# Patient Record
Sex: FEMALE | Race: WHITE | NOT HISPANIC OR LATINO | Employment: PART TIME | URBAN - METROPOLITAN AREA
[De-identification: names, ages, dates, MRNs, and addresses within clinical notes are randomized per-mention and may not be internally consistent; named-entity substitution may affect disease eponyms.]

---

## 2019-05-14 ENCOUNTER — HOSPITAL ENCOUNTER (OUTPATIENT)
Dept: RADIOLOGY | Facility: HOSPITAL | Age: 49
Discharge: HOME | End: 2019-05-14
Payer: COMMERCIAL

## 2019-05-14 ENCOUNTER — TRANSCRIBE ORDERS (OUTPATIENT)
Dept: ADMINISTRATIVE | Facility: HOSPITAL | Age: 49
End: 2019-05-14

## 2019-05-14 DIAGNOSIS — M25.551 RIGHT HIP PAIN: Primary | ICD-10-CM

## 2019-05-14 DIAGNOSIS — M25.551 RIGHT HIP PAIN: ICD-10-CM

## 2019-05-14 PROCEDURE — 72110 X-RAY EXAM L-2 SPINE 4/>VWS: CPT

## 2019-05-14 PROCEDURE — 73502 X-RAY EXAM HIP UNI 2-3 VIEWS: CPT

## 2021-03-30 DIAGNOSIS — Z23 ENCOUNTER FOR IMMUNIZATION: ICD-10-CM

## 2021-04-01 ENCOUNTER — IMMUNIZATIONS (OUTPATIENT)
Dept: FAMILY MEDICINE CLINIC | Facility: HOSPITAL | Age: 51
End: 2021-04-01

## 2021-04-01 DIAGNOSIS — Z23 ENCOUNTER FOR IMMUNIZATION: Primary | ICD-10-CM

## 2021-04-01 PROCEDURE — 91301 SARS-COV-2 / COVID-19 MRNA VACCINE (MODERNA) 100 MCG: CPT

## 2021-04-01 PROCEDURE — 0011A SARS-COV-2 / COVID-19 MRNA VACCINE (MODERNA) 100 MCG: CPT

## 2021-04-29 ENCOUNTER — IMMUNIZATIONS (OUTPATIENT)
Dept: FAMILY MEDICINE CLINIC | Facility: HOSPITAL | Age: 51
End: 2021-04-29

## 2021-04-29 DIAGNOSIS — Z23 ENCOUNTER FOR IMMUNIZATION: Primary | ICD-10-CM

## 2021-04-29 PROCEDURE — 0012A SARS-COV-2 / COVID-19 MRNA VACCINE (MODERNA) 100 MCG: CPT

## 2021-04-29 PROCEDURE — 91301 SARS-COV-2 / COVID-19 MRNA VACCINE (MODERNA) 100 MCG: CPT

## 2022-04-15 ENCOUNTER — APPOINTMENT (OUTPATIENT)
Dept: RADIOLOGY | Facility: CLINIC | Age: 52
End: 2022-04-15
Payer: COMMERCIAL

## 2022-04-15 DIAGNOSIS — M54.6 PAIN IN THORACIC SPINE: ICD-10-CM

## 2022-04-15 PROCEDURE — 72072 X-RAY EXAM THORAC SPINE 3VWS: CPT

## 2022-08-06 PROCEDURE — 99285 EMERGENCY DEPT VISIT HI MDM: CPT

## 2022-08-07 ENCOUNTER — APPOINTMENT (EMERGENCY)
Dept: ULTRASOUND IMAGING | Facility: HOSPITAL | Age: 52
DRG: 419 | End: 2022-08-07
Payer: COMMERCIAL

## 2022-08-07 ENCOUNTER — APPOINTMENT (EMERGENCY)
Dept: CT IMAGING | Facility: HOSPITAL | Age: 52
DRG: 419 | End: 2022-08-07
Payer: COMMERCIAL

## 2022-08-07 ENCOUNTER — HOSPITAL ENCOUNTER (INPATIENT)
Facility: HOSPITAL | Age: 52
LOS: 1 days | Discharge: HOME | DRG: 419 | End: 2022-08-07
Attending: EMERGENCY MEDICINE | Admitting: INTERNAL MEDICINE
Payer: COMMERCIAL

## 2022-08-07 ENCOUNTER — ANESTHESIA EVENT (INPATIENT)
Dept: PERIOP | Facility: HOSPITAL | Age: 52
DRG: 419 | End: 2022-08-07
Payer: COMMERCIAL

## 2022-08-07 ENCOUNTER — ANESTHESIA (INPATIENT)
Dept: PERIOP | Facility: HOSPITAL | Age: 52
DRG: 419 | End: 2022-08-07
Payer: COMMERCIAL

## 2022-08-07 VITALS
RESPIRATION RATE: 20 BRPM | HEART RATE: 84 BPM | DIASTOLIC BLOOD PRESSURE: 74 MMHG | OXYGEN SATURATION: 96 % | HEIGHT: 67 IN | BODY MASS INDEX: 27.66 KG/M2 | SYSTOLIC BLOOD PRESSURE: 138 MMHG | TEMPERATURE: 97.2 F

## 2022-08-07 DIAGNOSIS — K81.0 ACUTE CHOLECYSTITIS: ICD-10-CM

## 2022-08-07 DIAGNOSIS — K80.12 CALCULUS OF GALLBLADDER WITH ACUTE AND CHRONIC CHOLECYSTITIS WITHOUT OBSTRUCTION: ICD-10-CM

## 2022-08-07 DIAGNOSIS — K81.9 CHOLECYSTITIS: Primary | ICD-10-CM

## 2022-08-07 PROBLEM — F41.9 ANXIETY: Status: ACTIVE | Noted: 2022-08-07

## 2022-08-07 LAB
2HR DELTA HS TROPONIN: 0 NG/L
ALBUMIN SERPL BCP-MCNC: 4.6 G/DL (ref 3.5–5)
ALP SERPL-CCNC: 131 U/L (ref 34–104)
ALT SERPL W P-5'-P-CCNC: 101 U/L (ref 7–52)
ANION GAP SERPL CALCULATED.3IONS-SCNC: 11 MMOL/L (ref 4–13)
ANION GAP SERPL CALCULATED.3IONS-SCNC: 9 MMOL/L (ref 4–13)
AST SERPL W P-5'-P-CCNC: 207 U/L (ref 13–39)
ATRIAL RATE: 79 BPM
BASOPHILS # BLD AUTO: 0.01 THOUSANDS/ΜL (ref 0–0.1)
BASOPHILS NFR BLD AUTO: 0 % (ref 0–1)
BILIRUB SERPL-MCNC: 0.87 MG/DL (ref 0.2–1)
BILIRUB UR QL STRIP: NEGATIVE
BUN SERPL-MCNC: 10 MG/DL (ref 5–25)
BUN SERPL-MCNC: 11 MG/DL (ref 5–25)
CALCIUM SERPL-MCNC: 9.3 MG/DL (ref 8.4–10.2)
CALCIUM SERPL-MCNC: 9.9 MG/DL (ref 8.4–10.2)
CARDIAC TROPONIN I PNL SERPL HS: 3 NG/L
CARDIAC TROPONIN I PNL SERPL HS: 3 NG/L
CHLORIDE SERPL-SCNC: 103 MMOL/L (ref 96–108)
CHLORIDE SERPL-SCNC: 107 MMOL/L (ref 96–108)
CLARITY UR: CLEAR
CO2 SERPL-SCNC: 22 MMOL/L (ref 21–32)
CO2 SERPL-SCNC: 24 MMOL/L (ref 21–32)
COLOR UR: YELLOW
CREAT SERPL-MCNC: 0.77 MG/DL (ref 0.6–1.3)
CREAT SERPL-MCNC: 0.87 MG/DL (ref 0.6–1.3)
EOSINOPHIL # BLD AUTO: 0.03 THOUSAND/ΜL (ref 0–0.61)
EOSINOPHIL NFR BLD AUTO: 1 % (ref 0–6)
ERYTHROCYTE [DISTWIDTH] IN BLOOD BY AUTOMATED COUNT: 12.3 % (ref 11.6–15.1)
ERYTHROCYTE [DISTWIDTH] IN BLOOD BY AUTOMATED COUNT: 12.4 % (ref 11.6–15.1)
GFR SERPL CREATININE-BSD FRML MDRD: 76 ML/MIN/1.73SQ M
GFR SERPL CREATININE-BSD FRML MDRD: 88 ML/MIN/1.73SQ M
GLUCOSE SERPL-MCNC: 126 MG/DL (ref 65–140)
GLUCOSE SERPL-MCNC: 144 MG/DL (ref 65–140)
GLUCOSE UR STRIP-MCNC: NEGATIVE MG/DL
HCT VFR BLD AUTO: 36.2 % (ref 34.8–46.1)
HCT VFR BLD AUTO: 38.1 % (ref 34.8–46.1)
HGB BLD-MCNC: 12 G/DL (ref 11.5–15.4)
HGB BLD-MCNC: 12.6 G/DL (ref 11.5–15.4)
HGB UR QL STRIP.AUTO: NEGATIVE
IMM GRANULOCYTES # BLD AUTO: 0.01 THOUSAND/UL (ref 0–0.2)
IMM GRANULOCYTES NFR BLD AUTO: 0 % (ref 0–2)
KETONES UR STRIP-MCNC: NEGATIVE MG/DL
LEUKOCYTE ESTERASE UR QL STRIP: NEGATIVE
LIPASE SERPL-CCNC: 11 U/L (ref 11–82)
LYMPHOCYTES # BLD AUTO: 1.62 THOUSANDS/ΜL (ref 0.6–4.47)
LYMPHOCYTES NFR BLD AUTO: 29 % (ref 14–44)
MCH RBC QN AUTO: 30.7 PG (ref 26.8–34.3)
MCH RBC QN AUTO: 31.3 PG (ref 26.8–34.3)
MCHC RBC AUTO-ENTMCNC: 33.1 G/DL (ref 31.4–37.4)
MCHC RBC AUTO-ENTMCNC: 33.1 G/DL (ref 31.4–37.4)
MCV RBC AUTO: 93 FL (ref 82–98)
MCV RBC AUTO: 94 FL (ref 82–98)
MONOCYTES # BLD AUTO: 0.3 THOUSAND/ΜL (ref 0.17–1.22)
MONOCYTES NFR BLD AUTO: 5 % (ref 4–12)
NEUTROPHILS # BLD AUTO: 3.69 THOUSANDS/ΜL (ref 1.85–7.62)
NEUTS SEG NFR BLD AUTO: 65 % (ref 43–75)
NITRITE UR QL STRIP: NEGATIVE
NRBC BLD AUTO-RTO: 0 /100 WBCS
P AXIS: 73 DEGREES
PH UR STRIP.AUTO: 7 [PH]
PLATELET # BLD AUTO: 199 THOUSANDS/UL (ref 149–390)
PLATELET # BLD AUTO: 222 THOUSANDS/UL (ref 149–390)
PMV BLD AUTO: 8.2 FL (ref 8.9–12.7)
PMV BLD AUTO: 8.2 FL (ref 8.9–12.7)
POTASSIUM SERPL-SCNC: 3.9 MMOL/L (ref 3.5–5.3)
POTASSIUM SERPL-SCNC: 4 MMOL/L (ref 3.5–5.3)
PR INTERVAL: 152 MS
PROT SERPL-MCNC: 7.2 G/DL (ref 6.4–8.4)
PROT UR STRIP-MCNC: NEGATIVE MG/DL
QRS AXIS: 67 DEGREES
QRSD INTERVAL: 94 MS
QT INTERVAL: 406 MS
QTC INTERVAL: 465 MS
RBC # BLD AUTO: 3.84 MILLION/UL (ref 3.81–5.12)
RBC # BLD AUTO: 4.11 MILLION/UL (ref 3.81–5.12)
SODIUM SERPL-SCNC: 138 MMOL/L (ref 135–147)
SODIUM SERPL-SCNC: 138 MMOL/L (ref 135–147)
SP GR UR STRIP.AUTO: 1.01 (ref 1–1.03)
T WAVE AXIS: 84 DEGREES
UROBILINOGEN UR QL STRIP.AUTO: 0.2 E.U./DL
VENTRICULAR RATE: 79 BPM
WBC # BLD AUTO: 5.1 THOUSAND/UL (ref 4.31–10.16)
WBC # BLD AUTO: 5.66 THOUSAND/UL (ref 4.31–10.16)

## 2022-08-07 PROCEDURE — 88304 TISSUE EXAM BY PATHOLOGIST: CPT | Performed by: PATHOLOGY

## 2022-08-07 PROCEDURE — 96375 TX/PRO/DX INJ NEW DRUG ADDON: CPT

## 2022-08-07 PROCEDURE — NC001 PR NO CHARGE: Performed by: SURGERY

## 2022-08-07 PROCEDURE — 47562 LAPAROSCOPIC CHOLECYSTECTOMY: CPT | Performed by: SURGERY

## 2022-08-07 PROCEDURE — 84484 ASSAY OF TROPONIN QUANT: CPT | Performed by: EMERGENCY MEDICINE

## 2022-08-07 PROCEDURE — 85027 COMPLETE CBC AUTOMATED: CPT | Performed by: PHYSICIAN ASSISTANT

## 2022-08-07 PROCEDURE — 93005 ELECTROCARDIOGRAM TRACING: CPT

## 2022-08-07 PROCEDURE — 85025 COMPLETE CBC W/AUTO DIFF WBC: CPT | Performed by: EMERGENCY MEDICINE

## 2022-08-07 PROCEDURE — 80048 BASIC METABOLIC PNL TOTAL CA: CPT | Performed by: PHYSICIAN ASSISTANT

## 2022-08-07 PROCEDURE — 83690 ASSAY OF LIPASE: CPT | Performed by: EMERGENCY MEDICINE

## 2022-08-07 PROCEDURE — 96374 THER/PROPH/DIAG INJ IV PUSH: CPT

## 2022-08-07 PROCEDURE — 99223 1ST HOSP IP/OBS HIGH 75: CPT | Performed by: PHYSICIAN ASSISTANT

## 2022-08-07 PROCEDURE — 96361 HYDRATE IV INFUSION ADD-ON: CPT

## 2022-08-07 PROCEDURE — 76705 ECHO EXAM OF ABDOMEN: CPT

## 2022-08-07 PROCEDURE — 99254 IP/OBS CNSLTJ NEW/EST MOD 60: CPT | Performed by: SURGERY

## 2022-08-07 PROCEDURE — 93010 ELECTROCARDIOGRAM REPORT: CPT | Performed by: INTERNAL MEDICINE

## 2022-08-07 PROCEDURE — 74177 CT ABD & PELVIS W/CONTRAST: CPT

## 2022-08-07 PROCEDURE — 80053 COMPREHEN METABOLIC PANEL: CPT | Performed by: EMERGENCY MEDICINE

## 2022-08-07 PROCEDURE — 99284 EMERGENCY DEPT VISIT MOD MDM: CPT | Performed by: EMERGENCY MEDICINE

## 2022-08-07 PROCEDURE — 36415 COLL VENOUS BLD VENIPUNCTURE: CPT | Performed by: EMERGENCY MEDICINE

## 2022-08-07 PROCEDURE — 47562 LAPAROSCOPIC CHOLECYSTECTOMY: CPT | Performed by: PHYSICIAN ASSISTANT

## 2022-08-07 PROCEDURE — 0FT44ZZ RESECTION OF GALLBLADDER, PERCUTANEOUS ENDOSCOPIC APPROACH: ICD-10-PCS | Performed by: SURGERY

## 2022-08-07 PROCEDURE — 81003 URINALYSIS AUTO W/O SCOPE: CPT | Performed by: EMERGENCY MEDICINE

## 2022-08-07 RX ORDER — HYDROMORPHONE HCL IN WATER/PF 6 MG/30 ML
0.2 PATIENT CONTROLLED ANALGESIA SYRINGE INTRAVENOUS ONCE
Status: COMPLETED | OUTPATIENT
Start: 2022-08-07 | End: 2022-08-07

## 2022-08-07 RX ORDER — EPHEDRINE SULFATE 50 MG/ML
INJECTION INTRAVENOUS AS NEEDED
Status: DISCONTINUED | OUTPATIENT
Start: 2022-08-07 | End: 2022-08-07

## 2022-08-07 RX ORDER — LEVOTHYROXINE SODIUM 25 UG/1
1 CAPSULE ORAL
COMMUNITY

## 2022-08-07 RX ORDER — DEXAMETHASONE SODIUM PHOSPHATE 10 MG/ML
INJECTION, SOLUTION INTRAMUSCULAR; INTRAVENOUS AS NEEDED
Status: DISCONTINUED | OUTPATIENT
Start: 2022-08-07 | End: 2022-08-07

## 2022-08-07 RX ORDER — ONDANSETRON 2 MG/ML
4 INJECTION INTRAMUSCULAR; INTRAVENOUS EVERY 6 HOURS PRN
Status: DISCONTINUED | OUTPATIENT
Start: 2022-08-07 | End: 2022-08-09 | Stop reason: HOSPADM

## 2022-08-07 RX ORDER — ACETAMINOPHEN 325 MG/1
650 TABLET ORAL EVERY 4 HOURS PRN
Status: DISCONTINUED | OUTPATIENT
Start: 2022-08-07 | End: 2022-08-09 | Stop reason: HOSPADM

## 2022-08-07 RX ORDER — LEVOTHYROXINE SODIUM 137 UG/1
1 CAPSULE ORAL
COMMUNITY

## 2022-08-07 RX ORDER — HEPARIN SODIUM 5000 [USP'U]/ML
5000 INJECTION, SOLUTION INTRAVENOUS; SUBCUTANEOUS EVERY 8 HOURS SCHEDULED
Status: DISCONTINUED | OUTPATIENT
Start: 2022-08-07 | End: 2022-08-09 | Stop reason: HOSPADM

## 2022-08-07 RX ORDER — ONDANSETRON 2 MG/ML
4 INJECTION INTRAMUSCULAR; INTRAVENOUS EVERY 6 HOURS PRN
Status: DISCONTINUED | OUTPATIENT
Start: 2022-08-07 | End: 2022-08-08 | Stop reason: SDUPTHER

## 2022-08-07 RX ORDER — OXYCODONE HYDROCHLORIDE AND ACETAMINOPHEN 5; 325 MG/1; MG/1
1 TABLET ORAL EVERY 4 HOURS PRN
Status: DISCONTINUED | OUTPATIENT
Start: 2022-08-07 | End: 2022-08-09 | Stop reason: HOSPADM

## 2022-08-07 RX ORDER — LEVOTHYROXINE SODIUM 0.03 MG/1
25 TABLET ORAL
Status: DISCONTINUED | OUTPATIENT
Start: 2022-08-07 | End: 2022-08-09 | Stop reason: HOSPADM

## 2022-08-07 RX ORDER — PROPOFOL 10 MG/ML
INJECTION, EMULSION INTRAVENOUS AS NEEDED
Status: DISCONTINUED | OUTPATIENT
Start: 2022-08-07 | End: 2022-08-07

## 2022-08-07 RX ORDER — ACETAMINOPHEN 325 MG/1
650 TABLET ORAL EVERY 6 HOURS PRN
Status: DISCONTINUED | OUTPATIENT
Start: 2022-08-07 | End: 2022-08-08 | Stop reason: SDUPTHER

## 2022-08-07 RX ORDER — ATORVASTATIN CALCIUM 40 MG/1
1 TABLET, FILM COATED ORAL
COMMUNITY
Start: 2016-03-30

## 2022-08-07 RX ORDER — DOCUSATE SODIUM 100 MG/1
100 CAPSULE, LIQUID FILLED ORAL 2 TIMES DAILY PRN
Qty: 30 CAPSULE | Refills: 0 | Status: SHIPPED | OUTPATIENT
Start: 2022-08-07 | End: 2022-09-06

## 2022-08-07 RX ORDER — FENTANYL CITRATE/PF 50 MCG/ML
25 SYRINGE (ML) INJECTION
Status: DISCONTINUED | OUTPATIENT
Start: 2022-08-07 | End: 2022-08-07 | Stop reason: HOSPADM

## 2022-08-07 RX ORDER — LIDOCAINE HYDROCHLORIDE 20 MG/ML
INJECTION, SOLUTION EPIDURAL; INFILTRATION; INTRACAUDAL; PERINEURAL AS NEEDED
Status: DISCONTINUED | OUTPATIENT
Start: 2022-08-07 | End: 2022-08-07

## 2022-08-07 RX ORDER — LORAZEPAM 2 MG/ML
0.5 INJECTION INTRAMUSCULAR EVERY 6 HOURS PRN
Status: DISCONTINUED | OUTPATIENT
Start: 2022-08-07 | End: 2022-08-09 | Stop reason: HOSPADM

## 2022-08-07 RX ORDER — NICOTINE 21 MG/24HR
1 PATCH, TRANSDERMAL 24 HOURS TRANSDERMAL DAILY
Status: DISCONTINUED | OUTPATIENT
Start: 2022-08-07 | End: 2022-08-07

## 2022-08-07 RX ORDER — METRONIDAZOLE 500 MG/100ML
500 INJECTION, SOLUTION INTRAVENOUS EVERY 8 HOURS
Status: DISCONTINUED | OUTPATIENT
Start: 2022-08-07 | End: 2022-08-09 | Stop reason: HOSPADM

## 2022-08-07 RX ORDER — ACETAMINOPHEN 325 MG/1
650 TABLET ORAL EVERY 4 HOURS PRN
Qty: 20 TABLET | Refills: 0 | Status: SHIPPED | OUTPATIENT
Start: 2022-08-07

## 2022-08-07 RX ORDER — KETOROLAC TROMETHAMINE 30 MG/ML
INJECTION, SOLUTION INTRAMUSCULAR; INTRAVENOUS AS NEEDED
Status: DISCONTINUED | OUTPATIENT
Start: 2022-08-07 | End: 2022-08-07

## 2022-08-07 RX ORDER — CLINDAMYCIN PHOSPHATE 150 MG/ML
INJECTION, SOLUTION INTRAVENOUS AS NEEDED
Status: DISCONTINUED | OUTPATIENT
Start: 2022-08-07 | End: 2022-08-07

## 2022-08-07 RX ORDER — DOCUSATE SODIUM 100 MG/1
100 CAPSULE, LIQUID FILLED ORAL 2 TIMES DAILY
Status: DISCONTINUED | OUTPATIENT
Start: 2022-08-07 | End: 2022-08-09 | Stop reason: HOSPADM

## 2022-08-07 RX ORDER — ONDANSETRON 2 MG/ML
4 INJECTION INTRAMUSCULAR; INTRAVENOUS ONCE
Status: COMPLETED | OUTPATIENT
Start: 2022-08-07 | End: 2022-08-07

## 2022-08-07 RX ORDER — SODIUM CHLORIDE, SODIUM LACTATE, POTASSIUM CHLORIDE, CALCIUM CHLORIDE 600; 310; 30; 20 MG/100ML; MG/100ML; MG/100ML; MG/100ML
100 INJECTION, SOLUTION INTRAVENOUS CONTINUOUS
Status: DISCONTINUED | OUTPATIENT
Start: 2022-08-07 | End: 2022-08-09 | Stop reason: HOSPADM

## 2022-08-07 RX ORDER — CIPROFLOXACIN 2 MG/ML
400 INJECTION, SOLUTION INTRAVENOUS EVERY 12 HOURS
Status: DISCONTINUED | OUTPATIENT
Start: 2022-08-07 | End: 2022-08-09 | Stop reason: HOSPADM

## 2022-08-07 RX ORDER — MIDAZOLAM HYDROCHLORIDE 2 MG/2ML
INJECTION, SOLUTION INTRAMUSCULAR; INTRAVENOUS AS NEEDED
Status: DISCONTINUED | OUTPATIENT
Start: 2022-08-07 | End: 2022-08-07

## 2022-08-07 RX ORDER — ROCURONIUM BROMIDE 10 MG/ML
INJECTION, SOLUTION INTRAVENOUS AS NEEDED
Status: DISCONTINUED | OUTPATIENT
Start: 2022-08-07 | End: 2022-08-07

## 2022-08-07 RX ORDER — HYDROMORPHONE HCL/PF 1 MG/ML
0.5 SYRINGE (ML) INJECTION EVERY 4 HOURS PRN
Status: DISCONTINUED | OUTPATIENT
Start: 2022-08-07 | End: 2022-08-07

## 2022-08-07 RX ORDER — SODIUM CHLORIDE, SODIUM LACTATE, POTASSIUM CHLORIDE, CALCIUM CHLORIDE 600; 310; 30; 20 MG/100ML; MG/100ML; MG/100ML; MG/100ML
INJECTION, SOLUTION INTRAVENOUS CONTINUOUS PRN
Status: DISCONTINUED | OUTPATIENT
Start: 2022-08-07 | End: 2022-08-07

## 2022-08-07 RX ORDER — OXYCODONE HYDROCHLORIDE AND ACETAMINOPHEN 5; 325 MG/1; MG/1
1 TABLET ORAL EVERY 4 HOURS PRN
Qty: 10 TABLET | Refills: 0 | Status: SHIPPED | OUTPATIENT
Start: 2022-08-07 | End: 2022-08-17

## 2022-08-07 RX ORDER — FENTANYL CITRATE 50 UG/ML
INJECTION, SOLUTION INTRAMUSCULAR; INTRAVENOUS AS NEEDED
Status: DISCONTINUED | OUTPATIENT
Start: 2022-08-07 | End: 2022-08-07

## 2022-08-07 RX ORDER — OXYCODONE HYDROCHLORIDE AND ACETAMINOPHEN 5; 325 MG/1; MG/1
2 TABLET ORAL EVERY 4 HOURS PRN
Status: DISCONTINUED | OUTPATIENT
Start: 2022-08-07 | End: 2022-08-09 | Stop reason: HOSPADM

## 2022-08-07 RX ORDER — ONDANSETRON 2 MG/ML
4 INJECTION INTRAMUSCULAR; INTRAVENOUS ONCE AS NEEDED
Status: DISCONTINUED | OUTPATIENT
Start: 2022-08-07 | End: 2022-08-07 | Stop reason: HOSPADM

## 2022-08-07 RX ORDER — MAGNESIUM HYDROXIDE 1200 MG/15ML
LIQUID ORAL AS NEEDED
Status: DISCONTINUED | OUTPATIENT
Start: 2022-08-07 | End: 2022-08-07 | Stop reason: HOSPADM

## 2022-08-07 RX ORDER — HYDROMORPHONE HCL/PF 1 MG/ML
0.5 SYRINGE (ML) INJECTION EVERY 4 HOURS PRN
Status: DISCONTINUED | OUTPATIENT
Start: 2022-08-07 | End: 2022-08-09 | Stop reason: HOSPADM

## 2022-08-07 RX ORDER — CIPROFLOXACIN 2 MG/ML
400 INJECTION, SOLUTION INTRAVENOUS ONCE
Status: COMPLETED | OUTPATIENT
Start: 2022-08-07 | End: 2022-08-07

## 2022-08-07 RX ADMIN — LIDOCAINE HYDROCHLORIDE 100 MG: 20 INJECTION, SOLUTION EPIDURAL; INFILTRATION; INTRACAUDAL; PERINEURAL at 10:30

## 2022-08-07 RX ADMIN — LEVOTHYROXINE SODIUM 137 MCG: 25 TABLET ORAL at 05:24

## 2022-08-07 RX ADMIN — IOHEXOL 75 ML: 350 INJECTION, SOLUTION INTRAVENOUS at 01:08

## 2022-08-07 RX ADMIN — CLINDAMYCIN PHOSPHATE 600 MG: 150 INJECTION, SOLUTION INTRAVENOUS at 10:39

## 2022-08-07 RX ADMIN — LEVOTHYROXINE SODIUM 25 MCG: 25 TABLET ORAL at 05:26

## 2022-08-07 RX ADMIN — EPHEDRINE SULFATE 10 MG: 50 INJECTION INTRAVENOUS at 10:38

## 2022-08-07 RX ADMIN — OXYCODONE HYDROCHLORIDE AND ACETAMINOPHEN 1 TABLET: 5; 325 TABLET ORAL at 12:30

## 2022-08-07 RX ADMIN — SUGAMMADEX 200 MG: 100 INJECTION, SOLUTION INTRAVENOUS at 11:07

## 2022-08-07 RX ADMIN — FENTANYL CITRATE 100 MCG: 50 INJECTION INTRAMUSCULAR; INTRAVENOUS at 10:26

## 2022-08-07 RX ADMIN — HYDROMORPHONE HYDROCHLORIDE 0.5 MG: 1 INJECTION, SOLUTION INTRAMUSCULAR; INTRAVENOUS; SUBCUTANEOUS at 08:45

## 2022-08-07 RX ADMIN — HYDROMORPHONE HYDROCHLORIDE 0.2 MG: 0.2 INJECTION, SOLUTION INTRAMUSCULAR; INTRAVENOUS; SUBCUTANEOUS at 00:26

## 2022-08-07 RX ADMIN — DEXAMETHASONE SODIUM PHOSPHATE 10 MG: 10 INJECTION INTRAMUSCULAR; INTRAVENOUS at 10:50

## 2022-08-07 RX ADMIN — KETOROLAC TROMETHAMINE 30 MG: 30 INJECTION, SOLUTION INTRAMUSCULAR at 11:04

## 2022-08-07 RX ADMIN — ONDANSETRON 4 MG: 2 INJECTION INTRAMUSCULAR; INTRAVENOUS at 00:26

## 2022-08-07 RX ADMIN — HYDROMORPHONE HYDROCHLORIDE 0.2 MG: 0.2 INJECTION, SOLUTION INTRAMUSCULAR; INTRAVENOUS; SUBCUTANEOUS at 03:33

## 2022-08-07 RX ADMIN — PROPOFOL 180 MG: 10 INJECTION, EMULSION INTRAVENOUS at 10:30

## 2022-08-07 RX ADMIN — OXYCODONE HYDROCHLORIDE AND ACETAMINOPHEN 1 TABLET: 5; 325 TABLET ORAL at 12:58

## 2022-08-07 RX ADMIN — MIDAZOLAM 2 MG: 1 INJECTION INTRAMUSCULAR; INTRAVENOUS at 10:26

## 2022-08-07 RX ADMIN — CIPROFLOXACIN 400 MG: 2 INJECTION, SOLUTION INTRAVENOUS at 04:25

## 2022-08-07 RX ADMIN — SODIUM CHLORIDE 1000 ML: 0.9 INJECTION, SOLUTION INTRAVENOUS at 00:26

## 2022-08-07 RX ADMIN — METRONIDAZOLE 500 MG: 500 INJECTION, SOLUTION INTRAVENOUS at 03:34

## 2022-08-07 RX ADMIN — ONDANSETRON 4 MG: 2 INJECTION INTRAMUSCULAR; INTRAVENOUS at 11:05

## 2022-08-07 RX ADMIN — SODIUM CHLORIDE, SODIUM LACTATE, POTASSIUM CHLORIDE, AND CALCIUM CHLORIDE: .6; .31; .03; .02 INJECTION, SOLUTION INTRAVENOUS at 10:21

## 2022-08-07 RX ADMIN — ROCURONIUM BROMIDE 50 MG: 50 INJECTION INTRAVENOUS at 10:30

## 2022-08-07 NOTE — ANESTHESIA POSTPROCEDURE EVALUATION
Post-Op Assessment Note    CV Status:  Stable  Pain Score: 0    Pain management: adequate     Mental Status:  Sleepy   Hydration Status:  Euvolemic   PONV Controlled:  Controlled   Airway Patency:  Patent      Post Op Vitals Reviewed: Yes      Staff: CRNA         No complications documented      BP   142/67   Temp   97 2   Pulse  95   Resp   12   SpO2   98

## 2022-08-07 NOTE — CONSULTS
Consultation - [address] 47 y o  female MRN: [id number]  Unit/Bed#: ED 24 Encounter: [id number]    Physician Requesting Consult: Renata Miller DO    Reason for Consult / Principal Problem: Acute cholecystitis    Inpatient consult to Acute Care Surgery  Consult performed by: Bandar Martinez PA-C  Consult ordered by: Lynn Toledo PA-C        History of Present Illness: Sherren Rasp is a 47y o  year old female with PMH of Graves Dz, tobacco abuse (19 pk/yr hx), uterine prolapse, and anxiety who presented to the ED on 8/7 with complaints of abdominal pain x 7 hours (started at 5 pm night prior)  Patient reports eating dinner around 4 pm which included chicken, tator tots, cheese, and ranch dressing  Patient states the pain started in epigastric region with eventual diffuse pain across abdomen described as "knots" with a tightening sensation associated with heart burn, burping, N/V x 6, abdominal distention, and frequent BMs x 4-5, denies diarrhea but states loose, denies melena/BRBPR  Patient states burping helped but abdominal pain would shortly return  Patient denies prior episodes or recent illness  In the ED, found to be afebrile, VSS, no leukocytosis, elevated LFTs  She was given pain medication and started on cipro and flagy  RUQ US was obtained revealing cholelithiasis with pericholecystic fluid suggesting acute cholecystitis  Patient then admitted with recommendation for general surgery consult  Upon interview, patient relays story above  Currently rating pain a 3/10 without nausea  Last ate at dinner  She denies fever, chills, dizziness, lightheadedness, CP, palpations, or SOB  She denies hx of prior abdominal surgeries  FH pertinent for mother with hx of acute cholecystitis  Denies alcohol or drug use         Past Medical History:  Past Medical History:   Diagnosis Date    Graves disease        Past Surgical History:   Past Surgical History:   Procedure Laterality Date    DENTAL SURGERY      ENDOMETRIAL ABLATION      HAND SURGERY Right     TONSILLECTOMY         Family History:  Family History   Problem Relation Age of Onset    Hypertension Mother    [facility] Arthritis Mother     Diabetes Mother     Cholecystitis Mother     Hepatitis Mother         C    Arthritis Father     Cancer Father         prostate       Social History:  Social History     Substance and Sexual Activity   Alcohol Use Never     Social History     Substance and Sexual Activity   Drug Use Never     Social History     Tobacco Use   Smoking Status Current Every Day Smoker    Packs/day: 0 50   Smokeless Tobacco Never Used     Marital Status: /Civil Union      Home Medications:   Prior to Admission medications    Medication Sig Start Date End Date Taking?  Authorizing Provider   atorvastatin (LIPITOR) 40 mg tablet Take 1 tablet by mouth daily with dinner 3/30/16  Yes Historical Provider, MD   Levothyroxine Sodium 137 MCG CAPS Take 1 capsule by mouth daily in the early morning With the 25 to equal 142mcg   Yes Historical Provider, MD   Levothyroxine Sodium 25 MCG CAPS Take 1 tablet by mouth daily in the early morning With 137 to equal 142   Yes Historical Provider, MD   sertraline (ZOLOFT) 50 mg tablet Take 50 mg by mouth daily   Yes Historical Provider, MD       Inpatient Medications:  Scheduled Meds:   Current Facility-Administered Medications   Medication Dose Route Frequency Provider Last Rate    acetaminophen  650 mg Oral Q4H PRN Nicole Dorado PA-C      ciprofloxacin  400 mg Intravenous [address], [place]      HYDROmorphone  0 5 mg Intravenous Q4H PRN Nicole Dorado PA-C      levothyroxine  137 mcg Oral Early Morning [place], [place]      levothyroxine  25 mcg Oral Early Morning [place], [place]      LORazepam  0 5 mg Intravenous Q6H PRN Nicole Dorado PA-C      metroNIDAZOLE  500 mg Intravenous Murray Chatterjee MD Stopped (08/07/22 0423)    ondansetron  4 mg Intravenous Q6H PRN Pancho Block PA-C      sertraline  50 mg Oral HS Sheilalamar Cano PA-C       Continuous Infusions:    PRN Meds:  acetaminophen, 650 mg, Q4H PRN  HYDROmorphone, 0 5 mg, Q4H PRN  LORazepam, 0 5 mg, Q6H PRN  ondansetron, 4 mg, Q6H PRN        Allergies: Allergies   Allergen Reactions    Hydrocodone-Acetaminophen Vomiting    Penicillins Other (See Comments)    Pollen Extract Cough and Sneezing    Tamiflu [Oseltamivir] Other (See Comments)     Pt reports she gets very sick       Review of Systems:  Review of Systems   Constitutional: Positive for appetite change  Negative for chills, diaphoresis and fever  HENT: Negative for ear pain and sore throat  Eyes: Negative for pain and visual disturbance  Respiratory: Negative for cough and shortness of breath  Cardiovascular: Negative for chest pain and palpitations  Gastrointestinal: Positive for abdominal distention, abdominal pain, diarrhea, nausea and vomiting  Negative for blood in stool  Genitourinary: Negative for dysuria and hematuria  Musculoskeletal: Negative for arthralgias and back pain  Skin: Negative for color change and rash  Neurological: Negative for dizziness, seizures and syncope  All other systems reviewed and are negative  Vital Signs:     Vitals:    08/07/22 0002 08/07/22 0423   BP: 137/79 167/83   BP Location: Right arm    Pulse: 91 81   Resp: 20 18   Temp: 97 9 °F (36 6 °C)    TempSrc: Oral    SpO2: 97% 98%   Height: 5' 6 5" (1 689 m)      Invasive Devices  Report    Peripheral Intravenous Line  Duration           Peripheral IV 08/07/22 Right Antecubital <1 day                Physical Exam:  Physical Exam  Constitutional:       General: She is not in acute distress  Appearance: Normal appearance  She is not diaphoretic  HENT:      Head: Normocephalic and atraumatic        Right Ear: External ear normal       Left Ear: External ear normal       Nose: Nose normal    Eyes:      General:         Right eye: No discharge  Left eye: No discharge  Neck:      Vascular: No carotid bruit  Cardiovascular:      Rate and Rhythm: Normal rate and regular rhythm  Heart sounds: No murmur heard  No friction rub  Pulmonary:      Effort: Pulmonary effort is normal       Breath sounds: Normal breath sounds  No wheezing, rhonchi or rales  Abdominal:      General: Abdomen is flat  There is distension  Palpations: Abdomen is soft  Tenderness: There is abdominal tenderness  There is no guarding or rebound  Comments: RUQ tenderness, +[place] sign   Musculoskeletal:         General: No deformity or signs of injury  Cervical back: No rigidity  No muscular tenderness  Right lower leg: No edema  Left lower leg: No edema  Skin:     General: Skin is warm and dry  Capillary Refill: Capillary refill takes less than 2 seconds  Findings: No rash  Neurological:      General: No focal deficit present  Mental Status: She is alert and oriented to person, place, and time  Psychiatric:         Mood and Affect: Mood normal          Behavior: Behavior normal          Thought Content: Thought content normal          Judgment: Judgment normal          Lab Results:     Results from last 7 days   Lab Units 08/07/22  0425 08/07/22  0025   WBC Thousand/uL 5 10 5 66   HEMOGLOBIN g/dL 12 0 12 6   HEMATOCRIT % 36 2 38 1   PLATELETS Thousands/uL 199 222     Results from last 7 days   Lab Units 08/07/22  0425 08/07/22  0025   POTASSIUM mmol/L 3 9 4 0   CHLORIDE mmol/L 107 103   CO2 mmol/L 22 24   BUN mg/dL 10 11   CREATININE mg/dL 0 77 0 87   CALCIUM mg/dL 9 3 9 9         Lab Results   Component Value Date    HGBA1C 6 6 (H) 04/08/2022     No results found for: CKTOTAL, CKMB, CKMBINDEX, TROPONINI    Imaging Studies:     RUQ US:   Cholelithiasis, with pericholecystic fluid, and positive sonographic Newsome sign    Favio Hunt thickness is top normal in size  Suspicious for acute cholecystitis  I have personally reviewed pertinent films in PACS    Assessment:  Principal Problem:    Acute cholecystitis  Active Problems:    Graves disease    Smoking    Anxiety      Plan:  -NPO/IVF  -OR today for lap lashon  -PRN pain meds/antiemetics  -cipro/flagyl    Risks and benefits of lap lashon were discussed in detail today with the patient  They understand and wish to proceed with ultimately surgical intervention  Claudette Rae was comfortable with our recommendations, and their questions were answered to their satisfaction  SIGNATURE: Jerod Schuster PA-C  DATE: August 7, 2022  TIME: 7:20 AM    * This note was completed in part utilizing [employer] direct voice recognition software  Grammatical errors, random word insertion, spelling mistakes, and incomplete sentences may be an occasional consequence of the system secondary to software limitations, ambient noise and hardware issues  At the time of dictation, efforts were made to edit, clarify and /or correct errors  Please read the chart carefully and recognize, using context, where substitutions have occurred  If you have any questions or concerns about the context, text or information contained within the body of this dictation, please contact myself, the provider, for further clarification

## 2022-08-07 NOTE — ED PROVIDER NOTES
History  Chief Complaint   Patient presents with    Abdominal Pain     Pt reports mid abd pain and indigestion that started after dinner; n/v, bloating and cramping: denies diarrhea     This is a 48-year-old female complains of epigastric, right upper quadrant and umbilical pain  States it is severe  Started approximately 1 hour after eating  She has never had anything like this before  She has never had any abdominal surgeries before  Notes that she has a strong family history of biliary disease  Notes that she also has a history of uterine prolapse personally  States urination has been normal recently  Notes that since the onset of discomfort she has had repeated vomiting and episodes of loose but formed bowel movements  States she has feeling of some mild distension  Symptoms are worse with palpation  Nothing seems to make them better  Prior to Admission Medications   Prescriptions Last Dose Informant Patient Reported? Taking?    Levothyroxine Sodium 137 MCG CAPS   Yes Yes   Sig: Take 1 capsule by mouth daily in the early morning With the 25 to equal 142mcg   Levothyroxine Sodium 25 MCG CAPS   Yes Yes   Sig: Take 1 tablet by mouth daily in the early morning With 137 to equal 142   atorvastatin (LIPITOR) 40 mg tablet   Yes Yes   Sig: Take 1 tablet by mouth daily with dinner   sertraline (ZOLOFT) 50 mg tablet   Yes Yes   Sig: Take 50 mg by mouth daily      Facility-Administered Medications: None       Past Medical History:   Diagnosis Date    Graves disease        Past Surgical History:   Procedure Laterality Date    DENTAL SURGERY      ENDOMETRIAL ABLATION      HAND SURGERY Right     TONSILLECTOMY         Family History   Problem Relation Age of Onset    Hypertension Mother    [facility] Arthritis Mother     Diabetes Mother     Cholecystitis Mother     Hepatitis Mother         C    Arthritis Father     Cancer Father         prostate     I have reviewed and agree with the history as documented  E-Cigarette/Vaping     E-Cigarette/Vaping Substances     Social History     Tobacco Use    Smoking status: Current Every Day Smoker     Packs/day: 0 50    Smokeless tobacco: Never Used   Substance Use Topics    Alcohol use: Never    Drug use: Never       Review of Systems   Constitutional: Negative for activity change, fatigue and fever  HENT: Negative for congestion  Eyes: Negative for visual disturbance  Respiratory: Negative for cough, chest tightness and shortness of breath  Cardiovascular: Negative for chest pain  Gastrointestinal: Positive for abdominal pain, diarrhea, nausea and vomiting  Genitourinary: Negative for dysuria  Skin: Negative for rash  Neurological: Negative for dizziness, weakness and numbness  Physical Exam  Physical Exam  Constitutional:       Appearance: She is well-developed  She is not ill-appearing, toxic-appearing or diaphoretic  HENT:      Head: Normocephalic and atraumatic  Eyes:      Conjunctiva/sclera: Conjunctivae normal       Pupils: Pupils are equal, round, and reactive to light  Cardiovascular:      Rate and Rhythm: Normal rate and regular rhythm  Heart sounds: Normal heart sounds  Pulmonary:      Effort: Pulmonary effort is normal  No respiratory distress  Breath sounds: Normal breath sounds  Abdominal:      General: Bowel sounds are normal       Palpations: Abdomen is soft  Tenderness: There is generalized abdominal tenderness and tenderness in the right upper quadrant, epigastric area and periumbilical area  There is guarding  There is no rebound  Musculoskeletal:         General: Normal range of motion  Cervical back: Normal range of motion and neck supple  Skin:     General: Skin is warm and dry  Capillary Refill: Capillary refill takes less than 2 seconds  Neurological:      General: No focal deficit present  Mental Status: She is alert and oriented to person, place, and time  Psychiatric:         Mood and Affect: Mood normal          Behavior: Behavior normal          Vital Signs  ED Triage Vitals [08/07/22 0002]   Temperature Pulse Respirations Blood Pressure SpO2   97 9 °F (36 6 °C) 91 20 137/79 97 %      Temp Source Heart Rate Source Patient Position - Orthostatic VS BP Location FiO2 (%)   Oral Monitor Sitting Right arm --      Pain Score       7           Vitals:    08/07/22 0002 08/07/22 0423   BP: 137/79 167/83   Pulse: 91 81   Patient Position - Orthostatic VS: Sitting          Visual Acuity  Visual Acuity    Flowsheet Row Most Recent Value   L Pupil Size (mm) 3   R Pupil Size (mm) 3   L Pupil Shape Round   R Pupil Shape Round          ED Medications  Medications   metroNIDAZOLE (FLAGYL) IVPB (premix) 500 mg 100 mL (0 mg Intravenous Stopped 8/7/22 0423)   ciprofloxacin (CIPRO) IVPB (premix in 5% dextrose) 400 mg 200 mL (has no administration in time range)   acetaminophen (TYLENOL) tablet 650 mg (has no administration in time range)   ondansetron (ZOFRAN) injection 4 mg (has no administration in time range)   HYDROmorphone (DILAUDID) injection 0 5 mg (has no administration in time range)   levothyroxine tablet 137 mcg (137 mcg Oral Given 8/7/22 0524)   levothyroxine tablet 25 mcg (25 mcg Oral Given 8/7/22 0526)   sertraline (ZOLOFT) tablet 50 mg (has no administration in time range)   LORazepam (ATIVAN) injection 0 5 mg (has no administration in time range)   ondansetron (ZOFRAN) injection 4 mg (4 mg Intravenous Given 8/7/22 0026)   HYDROmorphone HCl (DILAUDID) injection 0 2 mg (0 2 mg Intravenous Given 8/7/22 0026)   sodium chloride 0 9 % bolus 1,000 mL (0 mL Intravenous Stopped 8/7/22 0147)   iohexol (OMNIPAQUE) 350 MG/ML injection (MULTI-DOSE) 75 mL (75 mL Intravenous Given 8/7/22 0108)   HYDROmorphone HCl (DILAUDID) injection 0 2 mg (0 2 mg Intravenous Given 8/7/22 0333)   ciprofloxacin (CIPRO) IVPB (premix in 5% dextrose) 400 mg 200 mL (400 mg Intravenous New Bag 8/7/22 0425)       Diagnostic Studies  Results Reviewed     Procedure Component Value Units Date/Time    Basic metabolic panel [[id number]] Collected: 08/07/22 0425    Lab Status: Final result Specimen: Blood from Arm, Right Updated: 08/07/22 0451     Sodium 138 mmol/L      Potassium 3 9 mmol/L      Chloride 107 mmol/L      CO2 22 mmol/L      ANION GAP 9 mmol/L      BUN 10 mg/dL      Creatinine 0 77 mg/dL      Glucose 126 mg/dL      Calcium 9 3 mg/dL      eGFR 88 ml/min/1 73sq m     Narrative:      [place] guidelines for Chronic Kidney Disease (CKD):     Stage 1 with normal or high GFR (GFR > 90 mL/min/1 73 square meters)    Stage 2 Mild CKD (GFR = 60-89 mL/min/1 73 square meters)    Stage 3A Moderate CKD (GFR = 45-59 mL/min/1 73 square meters)    Stage 3B Moderate CKD (GFR = 30-44 mL/min/1 73 square meters)    Stage 4 Severe CKD (GFR = 15-29 mL/min/1 73 square meters)    Stage 5 End Stage CKD (GFR <15 mL/min/1 73 square meters)  Note: GFR calculation is accurate only with a steady state creatinine    CBC (With Platelets) [[id number]]  (Abnormal) Collected: 08/07/22 0425    Lab Status: Final result Specimen: Blood from Arm, Right Updated: 08/07/22 0435     WBC 5 10 Thousand/uL      RBC 3 84 Million/uL      Hemoglobin 12 0 g/dL      Hematocrit 36 2 %      MCV 94 fL      MCH 31 3 pg      MCHC 33 1 g/dL      RDW 12 4 %      Platelets 496 Thousands/uL      MPV 8 2 fL     HS Troponin I 2hr [[id number]]  (Normal) Collected: 08/07/22 0334    Lab Status: Final result Specimen: Blood from Arm, Right Updated: 08/07/22 0407     hs TnI 2hr 3 ng/L      Delta 2hr hsTnI 0 ng/L     UA w Reflex to Microscopic w Reflex to Culture [[id number]]  (Normal) Collected: 08/07/22 0143    Lab Status: Final result Specimen: Urine, Clean Catch Updated: 08/07/22 0149     Color, UA Yellow     Clarity, UA Clear     Specific Gravity, UA 1 010     pH, UA 7 0     Leukocytes, UA Negative     Nitrite, UA Negative     Protein, UA Negative mg/dl      Glucose, UA Negative mg/dl      Ketones, UA Negative mg/dl      Urobilinogen, UA 0 2 E U /dl      Bilirubin, UA Negative     Occult Blood, UA Negative    HS Troponin 0hr (reflex protocol) [[id number]]  (Normal) Collected: 08/07/22 0025    Lab Status: Final result Specimen: Blood from Arm, Right Updated: 08/07/22 0056     hs TnI 0hr 3 ng/L     CMP [[id number]]  (Abnormal) Collected: 08/07/22 0025    Lab Status: Final result Specimen: Blood from Arm, Right Updated: 08/07/22 0048     Sodium 138 mmol/L      Potassium 4 0 mmol/L      Chloride 103 mmol/L      CO2 24 mmol/L      ANION GAP 11 mmol/L      BUN 11 mg/dL      Creatinine 0 87 mg/dL      Glucose 144 mg/dL      Calcium 9 9 mg/dL       U/L       U/L      Alkaline Phosphatase 131 U/L      Total Protein 7 2 g/dL      Albumin 4 6 g/dL      Total Bilirubin 0 87 mg/dL      eGFR 76 ml/min/1 73sq m     Narrative:      [place] guidelines for Chronic Kidney Disease (CKD):     Stage 1 with normal or high GFR (GFR > 90 mL/min/1 73 square meters)    Stage 2 Mild CKD (GFR = 60-89 mL/min/1 73 square meters)    Stage 3A Moderate CKD (GFR = 45-59 mL/min/1 73 square meters)    Stage 3B Moderate CKD (GFR = 30-44 mL/min/1 73 square meters)    Stage 4 Severe CKD (GFR = 15-29 mL/min/1 73 square meters)    Stage 5 End Stage CKD (GFR <15 mL/min/1 73 square meters)  Note: GFR calculation is accurate only with a steady state creatinine    Lipase [[id number]]  (Normal) Collected: 08/07/22 0025    Lab Status: Final result Specimen: Blood from Arm, Right Updated: 08/07/22 0048     Lipase 11 u/L     CBC and differential [[id number]]  (Abnormal) Collected: 08/07/22 0025    Lab Status: Final result Specimen: Blood from Arm, Right Updated: 08/07/22 0032     WBC 5 66 Thousand/uL      RBC 4 11 Million/uL      Hemoglobin 12 6 g/dL      Hematocrit 38 1 %      MCV 93 fL      MCH 30 7 pg      MCHC 33 1 g/dL      RDW 12 3 %      MPV 8 2 fL Platelets 595 Thousands/uL      nRBC 0 /100 WBCs      Neutrophils Relative 65 %      Immat GRANS % 0 %      Lymphocytes Relative 29 %      Monocytes Relative 5 %      Eosinophils Relative 1 %      Basophils Relative 0 %      Neutrophils Absolute 3 69 Thousands/µL      Immature Grans Absolute 0 01 Thousand/uL      Lymphocytes Absolute 1 62 Thousands/µL      Monocytes Absolute 0 30 Thousand/µL      Eosinophils Absolute 0 03 Thousand/µL      Basophils Absolute 0 01 Thousands/µL                  US right upper quadrant   Final Result by Shakila Lanza MD (08/07 2329)      Cholelithiasis, with pericholecystic fluid, and positive sonographic Newsome sign  Wall thickness is top normal in size  Suspicious for acute cholecystitis  Recommend general surgery consultation  The study was marked in [facility] for immediate notification  Workstation performed: [id number]         CT Abdomen pelvis with contrast   Final Result by Joanie Jacome MD (08/07 0123)      No acute intra-abdominal abnormality  No free air or free fluid  Normal appendix visualized  Cholelithiasis with no pericholecystic inflammatory changes  Workstation performed: [id number]                    Procedures  Procedures         ED Course                               SBIRT 20yo+    Flowsheet Row Most Recent Value   SBIRT (25 yo +)    In order to provide better care to our patients, we are screening all of our patients for alcohol and drug use  Would it be okay to ask you these screening questions? Yes Filed at: 08/07/2022 0009   Initial Alcohol Screen: US AUDIT-C     1  How often do you have a drink containing alcohol? 0 Filed at: 08/07/2022 0009   2  How many drinks containing alcohol do you have on a typical day you are drinking? 0 Filed at: 08/07/2022 0009   3a  Male UNDER 65: How often do you have five or more drinks on one occasion? 0 Filed at: 08/07/2022 0009   3b  FEMALE Any Age, or MALE 65+:  How often do you have 4 or more drinks on one occassion? 0 Filed at: 08/07/2022 0009   Audit-C Score 0 Filed at: 08/07/2022 0009   DINO: How many times in the past year have you    Used an illegal drug or used a prescription medication for non-medical reasons? Never Filed at: 08/07/2022 0009                    MDM  Number of Diagnoses or Management Options  Cholecystitis: new and requires workup  Diagnosis management comments: This is a 68-year-old female with cholecystitis  Patient discussed with Dr Mikael Santacruz of surgery who will consult  Patient admitted to medical service  Started on Cipro Flagyl due to penicillin allergy  Pain controlled with Dilaudid, nausea controlled with Zofran  Stable at the time of admission  States understanding and agreement with the plan  Amount and/or Complexity of Data Reviewed  Clinical lab tests: ordered and reviewed  Tests in the radiology section of CPT®: ordered and reviewed  Discuss the patient with other providers: yes        Disposition  Final diagnoses:   Cholecystitis     Time reflects when diagnosis was documented in both MDM as applicable and the Disposition within this note     Time User Action Codes Description Comment    8/7/2022  3:28 AM Dana Lal Add [K81 9] Cholecystitis     8/7/2022  3:47 AM Boo PRESTON Add [K81 0] Acute cholecystitis       ED Disposition     ED Disposition   Admit    Condition   Stable    Date/Time   Sun Aug 7, 2022  3:28 AM    Comment   Case was discussed with clement and the patient's admission status was agreed to be Admission Status: inpatient status to the service of Dr ambriz   Follow-up Information    None         Patient's Medications   Discharge Prescriptions    No medications on file       No discharge procedures on file      PDMP Review     None          ED Provider  Electronically Signed by           Carolina Leon MD  08/07/22 5831

## 2022-08-07 NOTE — ANESTHESIA PREPROCEDURE EVALUATION
Procedure:  CHOLECYSTECTOMY LAPAROSCOPIC (N/A Abdomen)    Relevant Problems   NEURO/PSYCH   (+) Anxiety      PULMONARY   (+) Smoking      Digestive   (+) Calculus of gallbladder with acute and chronic cholecystitis without obstruction      Endocrine   (+) Graves disease             Anesthesia Plan  ASA Score- 2     Anesthesia Type- general with ASA Monitors  Additional Monitors:   Airway Plan: ETT  Plan Factors-    Chart reviewed  Induction- intravenous  Postoperative Plan- Plan for postoperative opioid use  Planned trial extubation    Informed Consent- Anesthetic plan and risks discussed with patient  I personally reviewed this patient with the CRNA  Discussed and agreed on the Anesthesia Plan with the TOD Bonilla

## 2022-08-07 NOTE — ASSESSMENT & PLAN NOTE
· NPO  · Surgery consult  · Pain control  · IVF  · Monitor liver enzymes  · ED reviewed w/ surgery recommended cipro/flagyl  · RUQ US:  Cholelithiasis, with pericholecystic fluid, and positive sonographic Newsome sign   Wall thickness is top normal in size   Suspicious for acute cholecystitis   Recommend general surgery consultation

## 2022-08-07 NOTE — DISCHARGE INSTRUCTIONS
Laparoscopic Cholecystectomy   AMBULATORY CARE:   What you need to know about a laparoscopic cholecystectomy:  Laparoscopic cholecystectomy is surgery to remove gallstones and your gallbladder  How to prepare for surgery: Your surgeon will tell you how to prepare  You may be told not to eat or drink anything after midnight on the day of surgery  Arrange to have someone drive you home after surgery and stay with you for 24 hours  Tell your surgeon all the medicines you currently take  He or she will tell you if you need to stop any medicine for surgery, and when to stop  He or she will tell you which medicines to take or not take on the day of surgery  You may need blood or urine tests  You may also need x-rays, an ultrasound, or a CT scan  Tell your surgeon if you had an allergic reaction to contrast liquid  Tell your surgeon about any allergies you have, including medicines and anesthesia  What will happen during surgery: Your surgeon will make between 1 and 4 small incisions in your abdomen or belly button  He or she will insert small tools into the incisions  Your abdomen will be filled with carbon dioxide gas to make it swell  This helps your surgeon see your organs better and gives more room to move the tools around  Your surgeon will look for and remove gallstones in and around your gallbladder  X-rays or an ultrasound may be used  Your surgeon will remove your gallbladder through one of the incisions  The carbon dioxide will be released from your abdomen  The incisions will be closed with stitches, medical glue, or adhesive strips, then covered with bandages  What to expect after surgery: You will be taken to a recovery room until you are fully awake  Healthcare providers will monitor you closely for any problems  Providers will help you walk around to prevent blood clots  You may be able to go home later the same day, or you may stay in the hospital overnight    Pain, a sore throat, nausea, and vomiting are common after this surgery  These should get better within a few days  You may also have diarrhea that lasts up to a few months  Medicines may be given to prevent or treat pain, nausea, and vomiting  Medicines may also be given to prevent a bacterial infection  Blood thinners may be given to prevent blood clots  You may be bleed or bruise more easily while you are taking blood thinners  Your surgeon will tell you when to remove the bandages covering the surgery area  He or she will tell you when it is okay to start bathing  You may need to take showers instead of baths for a few days  Your surgeon will tell you when you can drive, return to work, and do your regular daily activities  You will be shown how to care for the surgery area and check for signs of infection  You will also be told which foods to eat in the days and weeks after surgery  Risks of a laparoscopic cholecystectomy:   You could bleed more than expected or get an infection  Any carbon dioxide gas still in your body can cause neck and shoulder pain  Your gallbladder may leak bile into your abdomen during or after surgery  This can cause a severe infection or an abscess  You may still have gallstones after surgery  You may need a different procedure to remove them  Your surgeon may need to make a larger incision than expected during surgery  Your bile duct, bowel, or other organs could be damaged during surgery  This can be life-threatening  Call your local emergency number (911 in the [address] [facility]) if:   You feel lightheaded, short of breath, and have chest pain  You cough up blood  Seek care immediately if:   Your arm or leg feels warm, tender, and painful  It may look swollen and red  You cannot stop vomiting  Your bowel movements are black or bloody  You have pain in your abdomen and it is swollen or hard  You have a fever over 101°F (38°C) or chills      Call your doctor or surgeon if: You have pain or nausea that is not relieved by medicine  You have redness and swelling around your incision sites  You have blood or pus leaking from your incision sites  You are constipated, have diarrhea, or your bowel movements are pale  Your skin or eyes are yellow  You have questions or concerns about your surgery, condition, or care  Medicines: You may need any of the following:  Prescription pain medicine  may be given  Ask your healthcare provider how to take this medicine safely  Some prescription pain medicines contain acetaminophen  Do not take other medicines that contain acetaminophen without talking to your healthcare provider  Too much acetaminophen may cause liver damage  Prescription pain medicine may cause constipation  Ask your healthcare provider how to prevent or treat constipation  NSAIDs  help decrease swelling and pain or fever  This medicine is available with or without a doctor's order  NSAIDs can cause stomach bleeding or kidney problems in certain people  If you take blood thinner medicine, always ask your healthcare provider if NSAIDs are safe for you  Always read the medicine label and follow directions  Take your medicine as directed  Contact your healthcare provider if you think your medicine is not helping or if you have side effects  Tell him or her if you are allergic to any medicine  Keep a list of the medicines, vitamins, and herbs you take  Include the amounts, and when and why you take them  Bring the list or the pill bottles to follow-up visits  Carry your medicine list with you in case of an emergency  Take deep breaths and cough 10 times each hour: This will decrease your risk for a lung infection  Take a deep breath and hold it for as long as you can  Then let the air out and cough strongly  You may be given an incentive spirometer to help you take deep breaths  Put the plastic piece in your mouth and take a slow, deep breath   Then let the air out and cough  Repeat these steps 10 times every hour  Care for the surgery area:   Remove the bandages as directed  Your surgeon may tell you to remove the bandages the day after surgery  Keep the area clean and dry  You may take a shower the day after your surgery  Do not take baths, swim, or soak in a hot tub until your surgeon says it is okay  Check for signs of infection each day  Check the area for swelling, red streaks, or pus  Tell your surgeon right away if you see any of these  Hug a pillow against the surgery area before you sneeze or cough  This will help prevent pain and protect the surgery area  What to eat after surgery:   Eat low-fat foods for 4 to 6 weeks  while your body learns to digest fat without a gallbladder  Slowly increase the amount of fat that you eat  Drink more liquids  Ask how much liquid to drink and which liquids are best for you  When to return to work and other activities:   Rest often  and slowly increase your activity level each day  If an activity causes pain, wait several days before you do that activity again  Do not drive  for the first 24 hours after surgery  Your surgeon will tell you when it is okay to drive after the first 24 hours  This is usually after you have stopped taking narcotic pain medicine for a few days  Do not lift anything heavier than 10 pounds  for 4 to 6 weeks, or as directed  You may return to work or other activities  as soon as your pain is controlled and you feel comfortable  This is usually 5 to 7 days after surgery  Follow up with your doctor or surgeon as directed:  Write down your questions so you remember to ask them during your visits  © Copyright [employer] 2022 Information is for End User's use only and may not be sold, redistributed or otherwise used for commercial purposes   All illustrations and images included in CareNotes® are the copyrighted property of A D A M , Inc  or [employer]  The above information is an  only  It is not intended as medical advice for individual conditions or treatments  Talk to your doctor, nurse or pharmacist before following any medical regimen to see if it is safe and effective for you

## 2022-08-07 NOTE — UTILIZATION REVIEW
Inpatient Admission Authorization Request   NOTIFICATION OF INPATIENT ADMISSION/INPATIENT AUTHORIZATION REQUEST   SERVICING FACILITY:   [address] [place] [address]  Tax ID: [id number]  NPI: [id number]  Place of Service: Inpatient [facility]  [address]  Place of Service Code: 24     ATTENDING PROVIDER:  Attending Name and NPI#: Scottie Denver, Md [[id number]]  Address: [address] [place] [address]  Phone: [phone]     UTILIZATION REVIEW CONTACT:  Markel Morin, Utilization   Network Utilization Review Department  Phone: [phone]  Fax [phone]  Email: [email or web address]     PHYSICIAN ADVISORY SERVICES:  FOR ATHP-FN-KBNB REVIEW - MEDICAL NECESSITY DENIAL  Phone: [phone]  Fax: [phone]  Email: [email or web address]     TYPE OF REQUEST:  Inpatient Status     ADMISSION INFORMATION:  ADMISSION DATE/TIME: 8/7/22  3:32 AM  PATIENT DIAGNOSIS CODE/DESCRIPTION:  Acute cholecystitis [K81 0]  Cholecystitis [K81 9]  Calculus of gallbladder with acute and chronic cholecystitis without obstruction [K80 12]  DISCHARGE DATE/TIME: No discharge date for patient encounter  IMPORTANT INFORMATION:  Please contact Markel Morin directly with any questions or concerns regarding this request  Department voicemails are confidential     Send requests for admission clinical reviews, concurrent reviews, approvals, and administrative denials due to lack of clinical to fax [phone]

## 2022-08-07 NOTE — H&P
[address] Boo[address]ose 1970, 47 y o  female MRN: [id number]  Unit/Bed#: ED 24 Encounter: [id number]  Primary Care Provider: Dallis Brittle   Date and time admitted to hospital: 8/7/2022 12:09 AM    * Acute cholecystitis  Assessment & Plan  · NPO  · Surgery consult  · Pain control  · IVF  · Monitor liver enzymes  · ED reviewed w/ surgery recommended cipro/flagyl  · RUQ US:  Cholelithiasis, with pericholecystic fluid, and positive sonographic Newsome sign   Wall thickness is top normal in size   Suspicious for acute cholecystitis   Recommend general surgery consultation  Anxiety  Assessment & Plan  · Continue sertraline  · Prn lorazepam    Smoking  Assessment & Plan  · Declined patch  · Smoking cessation recommended    Graves disease  Assessment & Plan  · S/p radiation  · Continue levothryoxine    VTE Pharmacologic Prophylaxis: VTE Score: 3 Moderate Risk (Score 3-4) - Pharmacological DVT Prophylaxis Contraindicated  Sequential Compression Devices Ordered  Code Status:  Full code  Discussion with patient and  Armaurizio Barefoot    Anticipated Length of Stay: Patient will be admitted on an inpatient basis with an anticipated length of stay of greater than 2 midnights secondary to IV abx, surgery consult  Chief Complaint: abdominal pain    History of Present Illness: Darek Sapp is a 47 y o  female with a PMH of tobacco abuse, thyroid disease who presents with abdominal pain  Had dinner around 4 pm with tator tots, chicken, ranch dressing and cheese  Developed pain around 5-530pm  Pain was epigastric and to the right  Felt like stomach tying self in knots and getting tighter and tighter, noted heart burn, burping  After burping pain would resolve and then come back  Vomited 6 times, no blood  ED treatment: Dilaudid 0 2mg x2, zofran 4mg, 1L IVF, Cirpo 400mg, Flagyl 500mg  Review of Systems:  Review of Systems   Constitutional: Negative for chills and fever     Respiratory: Negative for cough and shortness of breath  Cardiovascular: Negative for chest pain  Gastrointestinal: Positive for abdominal pain, nausea and vomiting  Negative for diarrhea  Neurological: Positive for headaches  Negative for dizziness and weakness  All other systems reviewed and are negative  Past Medical and Surgical History:   Past Medical History:   Diagnosis Date    Graves disease        Past Surgical History:   Procedure Laterality Date    DENTAL SURGERY      ENDOMETRIAL ABLATION      HAND SURGERY Right     TONSILLECTOMY         Meds/Allergies:  Prior to Admission medications    Not on File     I have reviewed home medications with patient personally  Allergies:    Allergies   Allergen Reactions    Hydrocodone-Acetaminophen Vomiting    Penicillins Other (See Comments)    Pollen Extract Cough and Sneezing    Tamiflu [Oseltamivir] Other (See Comments)     Pt reports she gets very sick       Social History:  Marital Status: /Civil Union   Occupation:    Patient Pre-hospital Living Situation: Home  Patient Pre-hospital Level of Mobility: walks  Patient Pre-hospital Diet Restrictions:  None  Substance Use History:   Social History     Substance and Sexual Activity   Alcohol Use Never     Social History     Tobacco Use   Smoking Status Current Every Day Smoker    Packs/day: 0 50   Smokeless Tobacco Never Used     Social History     Substance and Sexual Activity   Drug Use Never       Family History:  Family History   Problem Relation Age of Onset    Hypertension Mother    [facility] Arthritis Mother     Diabetes Mother     Cholecystitis Mother     Hepatitis Mother         C    Arthritis Father     Cancer Father         prostate       Physical Exam:     Vitals:   Blood Pressure: 137/79 (08/07/22 0002)  Pulse: 91 (08/07/22 0002)  Temperature: 97 9 °F (36 6 °C) (08/07/22 0002)  Temp Source: Oral (08/07/22 0002)  Respirations: 20 (08/07/22 0002)  Height: 5' 6 5" (168 9 cm) (08/07/22 0002)  SpO2: 97 % (08/07/22 0002)    Physical Exam  Vitals reviewed  Constitutional:       Appearance: Normal appearance  She is ill-appearing  HENT:      Head: Normocephalic and atraumatic  Nose: Nose normal    Eyes:      General:         Right eye: No discharge  Left eye: No discharge  Extraocular Movements: Extraocular movements intact  Conjunctiva/sclera: Conjunctivae normal    Cardiovascular:      Rate and Rhythm: Normal rate and regular rhythm  Pulmonary:      Effort: Pulmonary effort is normal  No respiratory distress  Breath sounds: Normal breath sounds  No wheezing  Abdominal:      General: Bowel sounds are normal  There is no distension  Palpations: Abdomen is soft  Tenderness: There is abdominal tenderness  There is no guarding  Positive signs include Newsome's sign  Musculoskeletal:         General: No swelling or tenderness  Normal range of motion  Cervical back: Normal range of motion  Right lower leg: No edema  Left lower leg: No edema  Skin:     General: Skin is warm and dry  Capillary Refill: Capillary refill takes less than 2 seconds  Coloration: Skin is pale  Neurological:      General: No focal deficit present  Mental Status: She is alert and oriented to person, place, and time  Mental status is at baseline  Psychiatric:         Mood and Affect: Mood normal          Behavior: Behavior normal          Thought Content:  Thought content normal          Judgment: Judgment normal         Additional Data:     Lab Results:  Results from last 7 days   Lab Units 08/07/22  0025   WBC Thousand/uL 5 66   HEMOGLOBIN g/dL 12 6   HEMATOCRIT % 38 1   PLATELETS Thousands/uL 222   NEUTROS PCT % 65   LYMPHS PCT % 29   MONOS PCT % 5   EOS PCT % 1     Results from last 7 days   Lab Units 08/07/22  0025   SODIUM mmol/L 138   POTASSIUM mmol/L 4 0   CHLORIDE mmol/L 103   CO2 mmol/L 24   BUN mg/dL 11   CREATININE mg/dL 0 87 ANION GAP mmol/L 11   CALCIUM mg/dL 9 9   ALBUMIN g/dL 4 6   TOTAL BILIRUBIN mg/dL 0 87   ALK PHOS U/L 131*   ALT U/L 101*   AST U/L 207*   GLUCOSE RANDOM mg/dL 144*     Imaging: Reviewed radiology reports from this admission including: abdominal/pelvic CT  US right upper quadrant   Final Result by Olvin Patel MD (08/07 1170)      Cholelithiasis, with pericholecystic fluid, and positive sonographic Newsome sign  Wall thickness is top normal in size  Suspicious for acute cholecystitis  Recommend general surgery consultation  The study was marked in [facility] for immediate notification  Workstation performed: [id number]         CT Abdomen pelvis with contrast   Final Result by Reyna Knox MD (08/07 0123)      No acute intra-abdominal abnormality  No free air or free fluid  Normal appendix visualized  Cholelithiasis with no pericholecystic inflammatory changes  Workstation performed: [id number]             ** Please Note: This note has been constructed using a voice recognition system   **

## 2022-08-07 NOTE — OP NOTE
OPERATIVE REPORT  PATIENT NAME: Matt Mayer    :  1970  MRN: [id number]  Pt Location: [place] OR ROOM 01    SURGERY DATE: 2022    Surgeon(s) and Role:     Latasha Pagan MD - Primary   Nato Negrete, [facility]  The PA was necessary to provide expert assistance; i e  in the form of providing optimal exposure with retraction, suturing, and assistance with dissection in order to perform the most efficient operation and in order to optimize patient safety in the abscence of a qualified surgical resident  Preop Diagnosis:  Calculus of gallbladder with acute and chronic cholecystitis without obstruction [K80 12]    Post-Op Diagnosis Codes:     * Calculus of gallbladder with acute and chronic cholecystitis without obstruction [K80 12]    Procedure(s) (LRB):  CHOLECYSTECTOMY LAPAROSCOPIC (N/A)    Specimen(s):  ID Type Source Tests Collected by Time Destination   1 :  Tissue Gallbladder TISSUE Cas Kennedy MD 2022 1052        Estimated Blood Loss:   Minimal    Drains:  * No LDAs found *    Anesthesia Type:   General    Operative Indications:  Calculus of gallbladder with acute and chronic cholecystitis without obstruction [K80 12]  The patient is a 79-year-old female presenting with signs and symptoms of acute calculous cholecystitis for which definitive treatment by laparoscopic cholecystectomy is now indicated  Operative Findings:  The patient was found to have acute on chronic calculous cholecystitis  Laparoscopic cholecystectomy was performed in the routine fashion  During the course the dissection the critical structures defined  The cystic duct dissected out circumferentially  The cystic artery dissected out circumferentially  The cholecystectomy completed laparoscopically  The patient tolerated the procedure well  Complications:   None    Procedure and Technique:  The patient was taken to the operating room where they were properly identified, monitored and anesthetized   They received antibiotics perioperatively  Venodyne's were placed prior to the induction of anesthesia for DVT prophylaxis  The abdomen prepped and draped under sterile conditions using aseptic technique  Timeout performed  Skin incised in the right upper quadrant    Peritoneal cavity entered bluntly with a 5 mm trocar  Pneumoperitoneum established to 15 mmHg  4 quadrants of the abdomen and inspected laparoscopically and no additional pathology was identified  3 additional working ports placed  These were an 11 mm port in the epigastrium and 2 additional 5 mm ports in the right upper quadrant  The patient placed in reverse Trendelenburg, left side down  Gallbladder grasped at its dome retracted cephalad and to the right  Infundibulum grasped and retracted laterally  Corder of Calot defined and skeletonized  Cystic duct dissected out circumferentially  Cystic artery dissected out circumferentially  Cystic artery clipped below and divided above with harmonic juana  A clip placed on the up side of the cystic duct  Cystic duct clipped twice on the down side and divided between clips  Gallbladder dissected off the liver with harmonic juana  The gallbladder was placed in an Endobag and delivered through the epigastric trocar site  Pneumoperitoneum reestablished to 15 mmHg  Scope advanced and the right upper quadrant inspected  Good hemostasis found  The fascial defect at the epigastrium closed with an 0 Vicryl  Ports removed  Skin closed with subcuticular 4-0 Monocryl suture  Wounds infiltrated with half percent Marcaine  The wounds dressed  The patient extubated and taken to recovery in stable condition       I was present for the entire procedure    Patient Disposition:  PACU       SIGNATURE: Paige Perez MD  DATE: August 7, 2022  TIME: 11:07 AM

## 2022-08-07 NOTE — ED NOTES
Patient transported to [address] [place] [address], [address] [place] [address]  08/07/22 8682

## 2022-08-08 ENCOUNTER — TELEPHONE (OUTPATIENT)
Dept: SURGERY | Facility: CLINIC | Age: 52
End: 2022-08-08

## 2022-08-08 NOTE — TELEPHONE ENCOUNTER
Patients  Minda Stoddard called, Caity Guajardo gallbladder surgery over the weekend  Today she has a runny nose and is asking if it's okay for her to take Aleve D? Per Dr Sabra Mead that will be fine  Spoke with Minda Stoddard to make him aware of this

## 2022-08-08 NOTE — UTILIZATION REVIEW
Initial Clinical Review    Admission: Date/Time/Statement:   Admission Orders (From admission, onward)     Ordered        08/07/22 0332  INPATIENT ADMISSION  Once                      Orders Placed This Encounter   Procedures    INPATIENT ADMISSION     Standing Status:   Standing     Number of Occurrences:   1     Order Specific Question:   Level of Care     Answer:   Med Surg [16]     Order Specific Question:   Estimated length of stay     Answer:   More than 2 Midnights     Order Specific Question:   Certification     Answer:   I certify that inpatient services are medically necessary for this patient for a duration of greater than two midnights  See H&P and MD Progress Notes for additional information about the patient's course of treatment  ED Arrival Information     Expected   -    Arrival   8/6/2022 23:52    Acuity   Urgent            Means of arrival   Walk-In    Escorted by   Spouse    Service   Surgery-General    Admission type   Urgent            Arrival complaint   severe stomach pain vomiting           Chief Complaint   Patient presents with    Abdominal Pain     Pt reports mid abd pain and indigestion that started after dinner; n/v, bloating and cramping: denies diarrhea       Initial Presentation: 47 y o  female with PMH of with PMH of tobacco abuse, grave's disease, anxiety presented to the ED form home with abdominal pain  Pt reports that she developed R sided epigastric pain around 5-5:30 pm after dinner  She felt like her stomach was tying self in knots and getting tighter and tighter, noted heart burn, burping  After burping pain would resolve and then come back  Vomited 6 times, no blood  In the ED, RUQ US:  Cholelithiasis, with pericholecystic fluid, and positive sonographic Newsome sign   Wall thickness is top normal in size   Suspicious for acute cholecystitis  On exam, aaox3  Pale, ill appearing, abdominal tenderness, + Newsome's sign   Given Dilaudid 0 2mg x2, zofran 4mg, 1L IVF, Cirpo 400mg, Flagyl 500mg  Plan: Inpatient admission for evaluation and treatment of acute cholecystitis: NPO  Surgery consult  Pain control  IVF  Monitor liver enzymes  Cipro/flagyl    08/07 Gen Surg Consult: Acute cholecystitis: Plan: NPO/IVF  OR today for lap lashon  PRN pain meds/antiemetics  cipro/flagyl    OP Note:   SURGERY DATE: 8/7/2022  Procedure(s) (LRB): CHOLECYSTECTOMY LAPAROSCOPIC (N/A)  Anesthesia Type: General  Operative Findings:  The patient was found to have acute on chronic calculous cholecystitis  Laparoscopic cholecystectomy was performed in the routine fashion      During the course the dissection the critical structures defined  The cystic duct dissected out circumferentially  The cystic artery dissected out circumferentially       The cholecystectomy completed laparoscopically  The patient tolerated the procedure well  08/07 Progress Notes: Pt was extubated and taken to the recovery room in stable condition s/p Lap lashon today  /67, T 97 2  Patent airway  Sleepy  Discharged to home w/ home health on 08/07 @ 1144       ED Triage Vitals [08/07/22 0002]   Temperature Pulse Respirations Blood Pressure SpO2   97 9 °F (36 6 °C) 91 20 137/79 97 %      Temp Source Heart Rate Source Patient Position - Orthostatic VS BP Location FiO2 (%)   Oral Monitor Sitting Right arm --      Pain Score       7          Wt Readings from Last 1 Encounters:   12/15/14 78 9 kg (174 lb)     Additional Vital Signs:   Date/Time Temp Pulse Resp BP MAP (mmHg) SpO2 Calculated FIO2 (%) - Nasal Cannula O2 Flow Rate (L/min) Nasal Cannula O2 Flow Rate (L/min) O2 Device Cardiac (WDL) Patient Position - Orthostatic VS   08/07/22 1335 -- 84 20 138/74 -- 96 % -- -- -- None (Room air) -- --   08/07/22 1219 97 2 °F (36 2 °C) Abnormal  82 20 136/72 -- 94 % -- -- -- None (Room air) -- --   08/07/22 1208 -- -- 18 138/72 99 98 % 28 -- 2 L/min Nasal cannula WDL --   08/07/22 1200 -- 85 16 -- -- 98 % 36 -- 4 L/min Nasal cannula WDL --   08/07/22 1153 -- 87 24 Abnormal  147/78 105 95 % -- 4 L/min -- Nasal cannula WDL --   08/07/22 1138 -- 91 18 116/58 83 98 % -- 6 L/min -- Simple mask WDL --   08/07/22 1123 97 2 °F (36 2 °C) Abnormal  92 -- 135/60 -- 98 % -- 6 L/min -- Simple mask WDL --   08/07/22 0423 -- 81 18 167/83 -- 98 % -- -- -- None (Room air) -- --       Pertinent Labs/Diagnostic Test Results:   US right upper quadrant   Final Result by Kassy Ramirez MD (08/07 6131)      Cholelithiasis, with pericholecystic fluid, and positive sonographic Newsome sign  Wall thickness is top normal in size  Suspicious for acute cholecystitis  Recommend general surgery consultation  The study was marked in [facility] for immediate notification  Workstation performed: [id number]         CT Abdomen pelvis with contrast   Final Result by Chris Crespo MD (08/07 0123)      No acute intra-abdominal abnormality  No free air or free fluid  Normal appendix visualized  Cholelithiasis with no pericholecystic inflammatory changes              Workstation performed: [id number]           08/07 EKG result: Normal sinus rhythm  T wave abnormality, consider anterior ischemia  Prolonged QT      Results from last 7 days   Lab Units 08/07/22  0425 08/07/22  0025   WBC Thousand/uL 5 10 5 66   HEMOGLOBIN g/dL 12 0 12 6   HEMATOCRIT % 36 2 38 1   PLATELETS Thousands/uL 199 222   NEUTROS ABS Thousands/µL  --  3 69         Results from last 7 days   Lab Units 08/07/22  0425 08/07/22  0025   SODIUM mmol/L 138 138   POTASSIUM mmol/L 3 9 4 0   CHLORIDE mmol/L 107 103   CO2 mmol/L 22 24   ANION GAP mmol/L 9 11   BUN mg/dL 10 11   CREATININE mg/dL 0 77 0 87   EGFR ml/min/1 73sq m 88 76   CALCIUM mg/dL 9 3 9 9     Results from last 7 days   Lab Units 08/07/22  0025   AST U/L 207*   ALT U/L 101*   ALK PHOS U/L 131*   TOTAL PROTEIN g/dL 7 2   ALBUMIN g/dL 4 6   TOTAL BILIRUBIN mg/dL 0 87         Results from last 7 days   Lab Units 08/07/22  0426 08/07/22  0025   GLUCOSE RANDOM mg/dL 126 144*       Results from last 7 days   Lab Units 08/07/22  0334 08/07/22  0025   HS TNI 0HR ng/L  --  3   HS TNI 2HR ng/L 3  --    HSTNI D2 ng/L 0  --        Results from last 7 days   Lab Units 08/07/22  0025   LIPASE u/L 11                 Results from last 7 days   Lab Units 08/07/22  0143   CLARITY UA  Clear   COLOR UA  Yellow   SPEC GRAV UA  1 010   PH UA  7 0   GLUCOSE UA mg/dl Negative   KETONES UA mg/dl Negative   BLOOD UA  Negative   PROTEIN UA mg/dl Negative   NITRITE UA  Negative   BILIRUBIN UA  Negative   UROBILINOGEN UA E U /dl 0 2   LEUKOCYTES UA  Negative     ED Treatment:   Medication Administration from 08/06/2022 2352 to 08/07/2022 0944       Date/Time Order Dose Route Action     08/07/2022 0026 ondansetron (ZOFRAN) injection 4 mg 4 mg Intravenous Given     08/07/2022 0026 HYDROmorphone HCl (DILAUDID) injection 0 2 mg 0 2 mg Intravenous Given     08/07/2022 0147 sodium chloride 0 9 % bolus 1,000 mL 0 mL Intravenous Stopped     08/07/2022 0026 sodium chloride 0 9 % bolus 1,000 mL 1,000 mL Intravenous New Bag     08/07/2022 0108 iohexol (OMNIPAQUE) 350 MG/ML injection (MULTI-DOSE) 75 mL 75 mL Intravenous Given     08/07/2022 0333 HYDROmorphone HCl (DILAUDID) injection 0 2 mg 0 2 mg Intravenous Given     08/07/2022 0739 ciprofloxacin (CIPRO) IVPB (premix in 5% dextrose) 400 mg 200 mL 0 mg Intravenous Stopped     08/07/2022 0425 ciprofloxacin (CIPRO) IVPB (premix in 5% dextrose) 400 mg 200 mL 400 mg Intravenous New Bag     08/07/2022 0423 metroNIDAZOLE (FLAGYL) IVPB (premix) 500 mg 100 mL 0 mg Intravenous Stopped     08/07/2022 0334 metroNIDAZOLE (FLAGYL) IVPB (premix) 500 mg 100 mL 500 mg Intravenous New Bag     08/07/2022 0845 HYDROmorphone (DILAUDID) injection 0 5 mg 0 5 mg Intravenous Given     08/07/2022 0524 levothyroxine tablet 137 mcg 137 mcg Oral Given     08/07/2022 0526 levothyroxine tablet 25 mcg 25 mcg Oral Given        Past Medical History: Diagnosis Date    Graves disease      Present on Admission:   Calculus of gallbladder with acute and chronic cholecystitis without obstruction   Smoking   Graves disease   Anxiety      Admitting Diagnosis: Acute cholecystitis [K81 0]  Cholecystitis [K81 9]  Calculus of gallbladder with acute and chronic cholecystitis without obstruction [K80 12]  Age/Sex: 47 y o  female  Admission Orders:  SCD  I/O    Scheduled Medications:  ciprofloxacin, 400 mg, Intravenous, Q12H  docusate sodium, 100 mg, Oral, BID  heparin (porcine), 5,000 Units, Subcutaneous, Q8H VANITA  levothyroxine, 137 mcg, Oral, Early Morning  levothyroxine, 25 mcg, Oral, Early Morning  metroNIDAZOLE, 500 mg, Intravenous, Q8H  sertraline, 50 mg, Oral, HS      Continuous IV Infusions:  lactated ringers, 100 mL/hr, Intravenous, Continuous      PRN Meds:  acetaminophen, 650 mg, Oral, Q4H PRN  HYDROmorphone, 0 5 mg, Intravenous, Q4H PRN  LORazepam, 0 5 mg, Intravenous, Q6H PRN  ondansetron, 4 mg, Intravenous, Q6H PRN 08 /07 x 1  oxyCODONE-acetaminophen, 1 tablet, Oral, Q4H PRN 08/07 x 1  oxyCODONE-acetaminophen, 2 tablet, Oral, Q4H PRN 08/07 x 1        IP CONSULT TO ACUTE CARE SURGERY    Network Utilization Review Department  ATTENTION: Please call with any questions or concerns to [phone] and carefully listen to the prompts so that you are directed to the right person  All voicemails are confidential   Shonda Bee all requests for admission clinical reviews, approved or denied determinations and any other requests to dedicated fax number below belonging to the campus where the patient is receiving treatment   List of dedicated fax numbers for the Facilities:  [address] DENIALS (Administrative/Medical Necessity) [phone]   [address] [place] [address] (Maternity/NICU/Pediatrics) 261 [facility],[address] [address] [place] [id number] [place] [address] [address] [place] [address] [place] [address] [place] [id number] [phone]

## 2022-08-08 NOTE — UTILIZATION REVIEW
Initial Clinical Review    Admission: Date/Time/Statement:   Admission Orders (From admission, onward)     Ordered        08/07/22 0332  INPATIENT ADMISSION  Once                      Orders Placed This Encounter   Procedures    INPATIENT ADMISSION     Standing Status:   Standing     Number of Occurrences:   1     Order Specific Question:   Level of Care     Answer:   Med Surg [16]     Order Specific Question:   Estimated length of stay     Answer:   More than 2 Midnights     Order Specific Question:   Certification     Answer:   I certify that inpatient services are medically necessary for this patient for a duration of greater than two midnights  See H&P and MD Progress Notes for additional information about the patient's course of treatment  ED Arrival Information     Expected   -    Arrival   8/6/2022 23:52    Acuity   Urgent            Means of arrival   Walk-In    Escorted by   Spouse    Service   Surgery-General    Admission type   Urgent            Arrival complaint   severe stomach pain vomiting           Chief Complaint   Patient presents with    Abdominal Pain     Pt reports mid abd pain and indigestion that started after dinner; n/v, bloating and cramping: denies diarrhea       Initial Presentation: 47 y o  female ***    Date: ***   Day 2: ***    ED Triage Vitals [08/07/22 0002]   Temperature Pulse Respirations Blood Pressure SpO2   97 9 °F (36 6 °C) 91 20 137/79 97 %      Temp Source Heart Rate Source Patient Position - Orthostatic VS BP Location FiO2 (%)   Oral Monitor Sitting Right arm --      Pain Score       7          Wt Readings from Last 1 Encounters:   12/15/14 78 9 kg (174 lb)     Additional Vital Signs: ***  Pertinent Labs/Diagnostic Test Results:   US right upper quadrant   Final Result by Nathalia Guardado MD (08/07 0242)      Cholelithiasis, with pericholecystic fluid, and positive sonographic Newsome sign  Wall thickness is top normal in size  Suspicious for acute cholecystitis  Recommend general surgery consultation  The study was marked in [facility] for immediate notification  Workstation performed: [id number]         CT Abdomen pelvis with contrast   Final Result by Shannon Hedrick MD (08/07 0123)      No acute intra-abdominal abnormality  No free air or free fluid  Normal appendix visualized  Cholelithiasis with no pericholecystic inflammatory changes              Workstation performed: [id number]               Results from last 7 days   Lab Units 08/07/22  0425 08/07/22  0025   WBC Thousand/uL 5 10 5 66   HEMOGLOBIN g/dL 12 0 12 6   HEMATOCRIT % 36 2 38 1   PLATELETS Thousands/uL 199 222   NEUTROS ABS Thousands/µL  --  3 69         Results from last 7 days   Lab Units 08/07/22  0425 08/07/22  0025   SODIUM mmol/L 138 138   POTASSIUM mmol/L 3 9 4 0   CHLORIDE mmol/L 107 103   CO2 mmol/L 22 24   ANION GAP mmol/L 9 11   BUN mg/dL 10 11   CREATININE mg/dL 0 77 0 87   EGFR ml/min/1 73sq m 88 76   CALCIUM mg/dL 9 3 9 9     Results from last 7 days   Lab Units 08/07/22  0025   AST U/L 207*   ALT U/L 101*   ALK PHOS U/L 131*   TOTAL PROTEIN g/dL 7 2   ALBUMIN g/dL 4 6   TOTAL BILIRUBIN mg/dL 0 87         Results from last 7 days   Lab Units 08/07/22  0425 08/07/22  0025   GLUCOSE RANDOM mg/dL 126 144*             No results found for: BETA-HYDROXYBUTYRATE                   Results from last 7 days   Lab Units 08/07/22  0334 08/07/22  0025   HS TNI 0HR ng/L  --  3   HS TNI 2HR ng/L 3  --    HSTNI D2 ng/L 0  --                                              Results from last 7 days   Lab Units 08/07/22  0025   LIPASE u/L 11                 Results from last 7 days   Lab Units 08/07/22  0143   CLARITY UA  Clear   COLOR UA  Yellow   SPEC GRAV UA  1 010   PH UA  7 0   GLUCOSE UA mg/dl Negative   KETONES UA mg/dl Negative   BLOOD UA  Negative   PROTEIN UA mg/dl Negative   NITRITE UA  Negative   BILIRUBIN UA  Negative   UROBILINOGEN UA E U /dl 0 2   LEUKOCYTES UA  Negative ED Treatment:   Medication Administration from 08/06/2022 2352 to 08/07/2022 0944       Date/Time Order Dose Route Action Action by Comments     08/07/2022 0026 ondansetron (ZOFRAN) injection 4 mg 4 mg Intravenous Given Edouard Albarado RN      08/07/2022 0026 HYDROmorphone HCl (DILAUDID) injection 0 2 mg 0 2 mg Intravenous Given Salo Ye RN      08/07/2022 0147 sodium chloride 0 9 % bolus 1,000 mL 0 mL Intravenous Stopped Salo Ye RN      08/07/2022 0026 sodium chloride 0 9 % bolus 1,000 mL 1,000 mL Intravenous [facility]      08/07/2022 0108 iohexol (OMNIPAQUE) 350 MG/ML injection (MULTI-DOSE) 75 mL 75 mL Intravenous Given [employer]      08/07/2022 0333 HYDROmorphone HCl (DILAUDID) injection 0 2 mg 0 2 mg Intravenous Given Salo Ye RN      08/07/2022 2774 ciprofloxacin (CIPRO) IVPB (premix in 5% dextrose) 400 mg 200 mL 0 mg Intravenous Stopped Maribel Jacques RN      08/07/2022 0425 ciprofloxacin (CIPRO) IVPB (premix in 5% dextrose) 400 mg 200 mL 400 mg Intravenous [facility]      08/07/2022 0423 metroNIDAZOLE (FLAGYL) IVPB (premix) 500 mg 100 mL 0 mg Intravenous Stopped Edouard Albarado RN      08/07/2022 8936 metroNIDAZOLE (FLAGYL) IVPB (premix) 500 mg 100 mL 500 mg Intravenous [facility]      08/07/2022 0845 HYDROmorphone (DILAUDID) injection 0 5 mg 0 5 mg Intravenous Given Brigid Cristobal RN      08/07/2022 0524 levothyroxine tablet 137 mcg 137 mcg Oral Given Salo Ye RN      08/07/2022 5829 levothyroxine tablet 25 mcg 25 mcg Oral Given Salo Ye RN         Past Medical History:   Diagnosis Date    Graves disease      Present on Admission:   Calculus of gallbladder with acute and chronic cholecystitis without obstruction   Smoking   Graves disease   Anxiety      Admitting Diagnosis: Acute cholecystitis [K81 0]  Cholecystitis [K81 9]  Calculus of gallbladder with acute and chronic cholecystitis without obstruction [K80 12]  Age/Sex: 47 y o  female  Admission Orders:  Scheduled Medications:  ciprofloxacin, 400 mg, Intravenous, Q12H  docusate sodium, 100 mg, Oral, BID  heparin (porcine), 5,000 Units, Subcutaneous, Q8H VANITA  levothyroxine, 137 mcg, Oral, Early Morning  levothyroxine, 25 mcg, Oral, Early Morning  metroNIDAZOLE, 500 mg, Intravenous, Q8H  sertraline, 50 mg, Oral, HS      Continuous IV Infusions:  lactated ringers, 100 mL/hr, Intravenous, Continuous      PRN Meds:  acetaminophen, 650 mg, Oral, Q4H PRN  HYDROmorphone, 0 5 mg, Intravenous, Q4H PRN  LORazepam, 0 5 mg, Intravenous, Q6H PRN  ondansetron, 4 mg, Intravenous, Q6H PRN  oxyCODONE-acetaminophen, 1 tablet, Oral, Q4H PRN  oxyCODONE-acetaminophen, 2 tablet, Oral, Q4H PRN        IP CONSULT TO ACUTE CARE SURGERY    Network Utilization Review Department  ATTENTION: Please call with any questions or concerns to [phone] and carefully listen to the prompts so that you are directed to the right person  All voicemails are confidential   Aneta Bettencourt all requests for admission clinical reviews, approved or denied determinations and any other requests to dedicated fax number below belonging to the campus where the patient is receiving treatment   List of dedicated fax numbers for the Facilities:  [address] [place] [address] DENIALS (Administrative/Medical Necessity) [phone]   [address] [place] [address] (Maternity/NICU/Pediatrics) 261 [facility],[address]  [address] [place] [address] [address] [place] [address] [place] [address] [facility] [phone]

## 2022-08-10 NOTE — DISCHARGE SUMMARY
Discharge Summary - Damian Fletcher 47 y o  female MRN: [id number]    Unit/Bed#: OR [place] Encounter: [id number]    Admission Date:   Admission Orders (From admission, onward)     Ordered        08/07/22 0332  INPATIENT ADMISSION  Once                        Admitting Diagnosis: Acute cholecystitis [K81 0]  Cholecystitis [K81 9]  Calculus of gallbladder with acute and chronic cholecystitis without obstruction [K80 12]    HPI:  Patient presents to the hospital signs and symptoms of acute calculous cholecystitis for which definitive treatment by laparoscopic cholecystectomy during this hospitalization was indicated  Procedures Performed: No orders of the defined types were placed in this encounter  Summary of Hospital Course:  Patient was admitted to the hospital   She underwent laparoscopic cholecystectomy and was discharged home same day  Significant Findings, Care, Treatment and Services Provided:  Laparoscopic cholecystectomy    Complications:  None    Discharge Diagnosis:  Acute calculous cholecystitis    Medical Problems             Resolved Problems  Date Reviewed: 8/7/2022   None                 Condition at Discharge: good         Discharge instructions/Information to patient and family:   See after visit summary for information provided to patient and family  Provisions for Follow-Up Care:  See after visit summary for information related to follow-up care and any pertinent home health orders  PCP: Anita Head    Disposition: Home    Planned Readmission: No      Discharge Statement   I spent 30 minutes discharging the patient  This time was spent on the day of discharge  I had direct contact with the patient on the day of discharge  Additional documentation is required if more than 30 minutes were spent on discharge  Discharge Medications:  See after visit summary for reconciled discharge medications provided to patient and family

## 2022-08-11 ENCOUNTER — TELEPHONE (OUTPATIENT)
Dept: SURGERY | Facility: CLINIC | Age: 52
End: 2022-08-11

## 2022-08-11 NOTE — TELEPHONE ENCOUNTER
Pt called about making an appt with dr Modesto Dial because he removed her gallbladder and she states that she would have to give our office a call back because she needs a later appt time

## 2022-08-17 ENCOUNTER — TELEPHONE (OUTPATIENT)
Dept: SURGERY | Facility: CLINIC | Age: 52
End: 2022-08-17

## 2022-08-25 RX ORDER — GUAIFENESIN 600 MG/1
TABLET, EXTENDED RELEASE ORAL
COMMUNITY

## 2022-08-25 RX ORDER — FEXOFENADINE HYDROCHLORIDE 60 MG/1
TABLET, FILM COATED ORAL
COMMUNITY

## 2022-08-25 RX ORDER — MONTELUKAST SODIUM 10 MG/1
TABLET ORAL
COMMUNITY

## 2022-08-26 ENCOUNTER — OFFICE VISIT (OUTPATIENT)
Dept: SURGERY | Facility: CLINIC | Age: 52
End: 2022-08-26

## 2022-08-26 VITALS — TEMPERATURE: 97.2 F

## 2022-08-26 DIAGNOSIS — K80.12 CALCULUS OF GALLBLADDER WITH ACUTE AND CHRONIC CHOLECYSTITIS WITHOUT OBSTRUCTION: Primary | ICD-10-CM

## 2022-08-26 PROCEDURE — 99024 POSTOP FOLLOW-UP VISIT: CPT | Performed by: SURGERY

## 2022-08-26 NOTE — LETTER
August 26, 2022     Den Vega MD  [address] [place] [address]    Patient: Shakeel Grigsby   YOB: 1970   Date of Visit: 8/26/2022       Dear Dr Antonia Aguilar:    Thank you for referring Qian Bravo to me for evaluation  Below are my notes for this consultation  If you have questions, please do not hesitate to call me  I look forward to following your patient along with you  Sincerely,        Kavno Montiel MD        CC: No Recipients  Kavon Montiel MD  8/26/2022  8:53 AM  Incomplete  Assessment/Plan:    Calculus of gallbladder with acute and chronic cholecystitis without obstruction  Patient returns for routine scheduled follow-up status post laparoscopic cholecystectomy for the definitive treatment of her acute calculous cholecystitis  Today the patient voiced no complaints referable to her abdomen  On physical exam she is well-appearing  She is in no acute distress  Pleasant competent reliable as a historian  Abdomen soft  The surgical wounds clean dry intact  Operative note and pathology report reviewed with the patient  All questions answered to satisfaction the patient regarding her resumption of normal diet and activity  She has been cleared to return to work on the 29th of August without restriction  She has been encouraged to follow up with the practice at any point future with questions or concerns  Subjective:      Patient ID: Shakeel Grigsby is a 47 y o  female  Patient came in today for a post op lap lashon 08/07/22  Her inc are healed but there are starting to itch and there some bruising, but theres no drainage  Patient has no complaints of any nausea/vomiting, diarrhea/constipation, or fevers or chills  Dash GUERRA MA        The following portions of the patient's history were reviewed and updated as appropriate: allergies, current medications, past family history, past medical history, past social history, past surgical history and problem list     Review of Systems   Constitutional: Negative for chills and fever  HENT: Negative for ear pain and sore throat  Eyes: Negative for pain and visual disturbance  Respiratory: Negative for cough and shortness of breath  Cardiovascular: Negative for chest pain and palpitations  Gastrointestinal: Negative for abdominal pain and vomiting  Genitourinary: Negative for dysuria and hematuria  Musculoskeletal: Negative for arthralgias and back pain  Skin: Negative for color change and rash  Neurological: Negative for seizures and syncope  All other systems reviewed and are negative  Objective:      Temp (!) 97 2 °F (36 2 °C) (Temporal)          Physical Exam  Constitutional:       Appearance: She is well-developed  HENT:      Head: Normocephalic and atraumatic  Eyes:      Conjunctiva/sclera: Conjunctivae normal       Pupils: Pupils are equal, round, and reactive to light  Cardiovascular:      Rate and Rhythm: Normal rate and regular rhythm  Pulmonary:      Effort: Pulmonary effort is normal       Breath sounds: Normal breath sounds  Abdominal:      General: Bowel sounds are normal       Palpations: Abdomen is soft  Musculoskeletal:         General: Normal range of motion  Cervical back: Normal range of motion and neck supple  Skin:     General: Skin is warm and dry  Neurological:      Mental Status: She is alert and oriented to person, place, and time  Psychiatric:         Behavior: Behavior normal          Thought Content:  Thought content normal          Judgment: Judgment normal

## 2022-08-26 NOTE — PROGRESS NOTES
Assessment/Plan:    Calculus of gallbladder with acute and chronic cholecystitis without obstruction  Patient returns for routine scheduled follow-up status post laparoscopic cholecystectomy for the definitive treatment of her acute calculous cholecystitis  Today the patient voiced no complaints referable to her abdomen  On physical exam she is well-appearing  She is in no acute distress  Pleasant competent reliable as a historian  Abdomen soft  The surgical wounds clean dry intact  Operative note and pathology report reviewed with the patient  All questions answered to satisfaction the patient regarding her resumption of normal diet and activity  She has been cleared to return to work on the 29th of August without restriction  She has been encouraged to follow up with the practice at any point future with questions or concerns  Subjective:      Patient ID: Levester Severin is a 47 y o  female  Patient came in today for a post op lap lashon 08/07/22  Her inc are healed but there are starting to itch and there some bruising, but theres no drainage  Patient has no complaints of any nausea/vomiting, diarrhea/constipation, or fevers or chills  Dash GUERRA MA        The following portions of the patient's history were reviewed and updated as appropriate: allergies, current medications, past family history, past medical history, past social history, past surgical history and problem list     Review of Systems   Constitutional: Negative for chills and fever  HENT: Negative for ear pain and sore throat  Eyes: Negative for pain and visual disturbance  Respiratory: Negative for cough and shortness of breath  Cardiovascular: Negative for chest pain and palpitations  Gastrointestinal: Negative for abdominal pain and vomiting  Genitourinary: Negative for dysuria and hematuria  Musculoskeletal: Negative for arthralgias and back pain  Skin: Negative for color change and rash  Neurological: Negative for seizures and syncope  All other systems reviewed and are negative  Objective:      Temp (!) 97 2 °F (36 2 °C) (Temporal)          Physical Exam  Constitutional:       Appearance: She is well-developed  HENT:      Head: Normocephalic and atraumatic  Eyes:      Conjunctiva/sclera: Conjunctivae normal       Pupils: Pupils are equal, round, and reactive to light  Cardiovascular:      Rate and Rhythm: Normal rate and regular rhythm  Pulmonary:      Effort: Pulmonary effort is normal       Breath sounds: Normal breath sounds  Abdominal:      General: Bowel sounds are normal       Palpations: Abdomen is soft  Musculoskeletal:         General: Normal range of motion  Cervical back: Normal range of motion and neck supple  Skin:     General: Skin is warm and dry  Neurological:      Mental Status: She is alert and oriented to person, place, and time  Psychiatric:         Behavior: Behavior normal          Thought Content:  Thought content normal          Judgment: Judgment normal

## 2022-08-26 NOTE — ASSESSMENT & PLAN NOTE
Patient returns for routine scheduled follow-up status post laparoscopic cholecystectomy for the definitive treatment of her acute calculous cholecystitis  Today the patient voiced no complaints referable to her abdomen  On physical exam she is well-appearing  She is in no acute distress  Pleasant competent reliable as a historian  Abdomen soft  The surgical wounds clean dry intact  Operative note and pathology report reviewed with the patient  All questions answered to satisfaction the patient regarding her resumption of normal diet and activity  She has been cleared to return to work on the 29th of August without restriction  She has been encouraged to follow up with the practice at any point future with questions or concerns

## 2024-04-06 NOTE — PROGRESS NOTES
Assessment        Diagnoses and all orders for this visit:    Vaginal atrophy  -     estradiol (Yuvafem) 10 MCG TABS vaginal tablet; Insert 1 tablet (10 mcg total) into the vagina 2 (two) times a week    Encntr for gyn exam (general) (routine) w/o abn findings    Cervical cancer screening  -     Liquid-based pap, screening    Encounter for screening mammogram for breast cancer  -     Cancel: Mammo screening bilateral w 3d & cad; Future  -     Mammo screening bilateral w 3d & cad; Future    Screening for colorectal cancer  -     Cancel: Ambulatory Referral to Gastroenterology; Future  -     Cologuard             Plan      All questions answered.  Await pap smear results.  Endocervical curettage.  Follow up in 1 year.  Follow up as needed.  Mammogram.  Pap smear.   Cologuard    Patient with vaginal atrophy.  She was prescribed Vagifem to use twice weekly for vaginal dryness.    Subjective      Adriana Yarbrough is a 53 y.o. female who presents for annual exam.      Chief Complaint   Patient presents with    New Patient Visit     Est care, yearly. Discuss dryness  during intercourse      6/3/14 / Alex h/o Novasure endometrial ablation for menorrhagia  No postmenopausal bleeding  She has vaginal dryness, has used lubricants        Last Pap: 14  Last mammogram: none  Colorectal cancer screening: none    Current contraception: post menopausal status  History of abnormal Pap smear: no  History of abnormal mammogram: no  Family history of uterine or ovarian cancer: no  Family history of breast cancer: no  Family history of colon cancer: no    Mom breast cysts - no CA  Dad prostate CA      OB History    Para Term  AB Living   0 0 0 0 0 0   SAB IAB Ectopic Multiple Live Births   0 0 0 0 0       Menstrual History:  OB History          0    Para   0    Term   0       0    AB   0    Living   0         SAB   0    IAB   0    Ectopic   0    Multiple   0    Live Births   0                Menarche  age: 11  No LMP recorded.             Past Medical History:   Diagnosis Date    Graves disease      Past Surgical History:   Procedure Laterality Date    CHOLECYSTECTOMY LAPAROSCOPIC N/A 8/7/2022    Procedure: CHOLECYSTECTOMY LAPAROSCOPIC;  Surgeon: Bryan Joel MD;  Location: [facility];  Service: General    DENTAL SURGERY      ENDOMETRIAL ABLATION      HAND SURGERY Right     TONSILLECTOMY       Family History   Problem Relation Age of Onset    Hypertension Mother     Arthritis Mother     Diabetes Mother     Cholecystitis Mother     Hepatitis Mother         C    Arthritis Father     Cancer Father         prostate       Social History     Tobacco Use    Smoking status: Every Day     Current packs/day: 0.50     Types: Cigarettes    Smokeless tobacco: Never   Vaping Use    Vaping status: Never Used   Substance Use Topics    Alcohol use: Never    Drug use: Never          Current Outpatient Medications:     atorvastatin (LIPITOR) 40 mg tablet, Take 1 tablet by mouth daily with dinner, Disp: , Rfl:     guaiFENesin (MUCINEX) 600 mg 12 hr tablet, Take by mouth, Disp: , Rfl:     Levothyroxine Sodium 137 MCG CAPS, Take 1 capsule by mouth daily in the early morning With the 25 to equal 142mcg, Disp: , Rfl:     sertraline (ZOLOFT) 50 mg tablet, Take 50 mg by mouth daily, Disp: , Rfl:     acetaminophen (TYLENOL) 325 mg tablet, Take 2 tablets (650 mg total) by mouth every 4 (four) hours as needed for mild pain, headaches or fever, Disp: 20 tablet, Rfl: 0    docusate sodium (COLACE) 100 mg capsule, Take 1 capsule (100 mg total) by mouth 2 (two) times a day as needed for constipation, Disp: 30 capsule, Rfl: 0    fexofenadine (ALLEGRA) 60 MG tablet, Take by mouth, Disp: , Rfl:     Levothyroxine Sodium 25 MCG CAPS, Take 1 tablet by mouth daily in the early morning With 137 to equal 142, Disp: , Rfl:     montelukast (SINGULAIR) 10 mg tablet, Take by mouth, Disp: , Rfl:     Allergies   Allergen Reactions     "Hydrocodone-Acetaminophen Vomiting    Oseltamivir Other (See Comments) and Hallucinations     Pt reports she gets very sick    Penicillins Other (See Comments)    Pollen Extract Cough and Sneezing           Review of Systems   Constitutional: Negative.    HENT: Negative.     Eyes: Negative.    Respiratory: Negative.     Cardiovascular: Negative.    Gastrointestinal: Negative.    Endocrine: Negative.    Genitourinary:         As noted in HPI   Musculoskeletal: Negative.    Skin: Negative.    Allergic/Immunologic: Negative.    Neurological: Negative.    Hematological: Negative.    Psychiatric/Behavioral: Negative.         /58 (BP Location: Right arm, Patient Position: Sitting, Cuff Size: Adult)   Pulse 86   Ht 5' 6.5\" (1.689 m)   SpO2 95%   BMI 27.66 kg/m²         Physical Exam  Constitutional:       Appearance: She is well-developed.   Genitourinary:      Vulva, bladder and rectum normal.      No lesions in the vagina.      Genitourinary Comments:         Right Labia: No rash, tenderness, lesions, skin changes or Bartholin's cyst.     Left Labia: No tenderness, lesions, skin changes, Bartholin's cyst or rash.     No inguinal adenopathy present in the right or left side.     No vaginal discharge, tenderness or bleeding.      No vaginal prolapse present.     No vaginal atrophy present.       Right Adnexa: not tender, not full and no mass present.     Left Adnexa: not tender, not full and no mass present.     No cervical motion tenderness, friability, lesion or polyp.      Uterus is not enlarged or tender.      Pelvic exam was performed with patient in the lithotomy position.   Rectum:      No external hemorrhoid.   Breasts:     Right: No mass, nipple discharge, skin change or tenderness.      Left: No mass, nipple discharge, skin change or tenderness.   HENT:      Head: Normocephalic.      Nose: Nose normal.   Eyes:      Conjunctiva/sclera: Conjunctivae normal.   Neck:      Thyroid: No thyromegaly. "   Cardiovascular:      Rate and Rhythm: Normal rate and regular rhythm.      Heart sounds: Normal heart sounds. No murmur heard.  Pulmonary:      Effort: Pulmonary effort is normal. No respiratory distress.      Breath sounds: Normal breath sounds. No wheezing or rales.   Abdominal:      General: There is no distension.      Palpations: Abdomen is soft. There is no mass.      Tenderness: There is no abdominal tenderness. There is no guarding or rebound.   Musculoskeletal:         General: No tenderness.      Cervical back: Neck supple. No muscular tenderness.   Lymphadenopathy:      Cervical: No cervical adenopathy.      Lower Body: No right inguinal adenopathy. No left inguinal adenopathy.   Neurological:      Mental Status: She is alert and oriented to person, place, and time.   Skin:     General: Skin is warm and dry.   Psychiatric:         Mood and Affect: Mood normal.         Behavior: Behavior normal.           Future Appointments   Date Time Provider Department Center   4/12/2024 11:00 AM Blossom Valentino MD Complete  [facility]   4/26/2024  9:30 AM Bryan Joel MD [facility]

## 2024-04-12 ENCOUNTER — OFFICE VISIT (OUTPATIENT)
Dept: OBGYN CLINIC | Facility: CLINIC | Age: 54
End: 2024-04-12
Payer: COMMERCIAL

## 2024-04-12 VITALS
HEART RATE: 86 BPM | BODY MASS INDEX: 27.66 KG/M2 | OXYGEN SATURATION: 95 % | HEIGHT: 67 IN | DIASTOLIC BLOOD PRESSURE: 58 MMHG | SYSTOLIC BLOOD PRESSURE: 120 MMHG

## 2024-04-12 DIAGNOSIS — Z12.12 SCREENING FOR COLORECTAL CANCER: ICD-10-CM

## 2024-04-12 DIAGNOSIS — N95.2 VAGINAL ATROPHY: Primary | ICD-10-CM

## 2024-04-12 DIAGNOSIS — Z12.31 ENCOUNTER FOR SCREENING MAMMOGRAM FOR BREAST CANCER: ICD-10-CM

## 2024-04-12 DIAGNOSIS — Z12.4 CERVICAL CANCER SCREENING: ICD-10-CM

## 2024-04-12 DIAGNOSIS — Z01.419 ENCNTR FOR GYN EXAM (GENERAL) (ROUTINE) W/O ABN FINDINGS: ICD-10-CM

## 2024-04-12 DIAGNOSIS — Z12.11 SCREENING FOR COLORECTAL CANCER: ICD-10-CM

## 2024-04-12 PROCEDURE — G0145 SCR C/V CYTO,THINLAYER,RESCR: HCPCS | Performed by: OBSTETRICS & GYNECOLOGY

## 2024-04-12 PROCEDURE — S0610 ANNUAL GYNECOLOGICAL EXAMINA: HCPCS | Performed by: OBSTETRICS & GYNECOLOGY

## 2024-04-12 RX ORDER — ESTRADIOL 10 UG/1
1 INSERT VAGINAL 2 TIMES WEEKLY
Qty: 24 TABLET | Refills: 3 | Status: SHIPPED | OUTPATIENT
Start: 2024-04-15

## 2024-04-15 LAB
HPV HR 12 DNA CVX QL NAA+PROBE: NEGATIVE
HPV16 DNA CVX QL NAA+PROBE: NEGATIVE
HPV18 DNA CVX QL NAA+PROBE: NEGATIVE

## 2024-04-20 LAB
LAB AP GYN PRIMARY INTERPRETATION: NORMAL
Lab: NORMAL

## 2024-04-25 NOTE — PROGRESS NOTES
"Assessment/Plan:    Neoplasm of uncertain behavior of skin of back  The patient is a pleasant 54-year-old female well-known to the practice presenting with a history of a mid right back lesion suspicious for squamous cell carcinoma for which definitive diagnosis and treatment by excisional biopsy here in the office under local is now indicated.    The options benefits risks and alternatives to the procedure were reviewed with the patient in the presence of her  and informed verbal consent obtained to proceed.       Diagnoses and all orders for this visit:    Neoplasm of uncertain behavior of skin of back          Subjective:      Patient ID: Adriana Yarbrough is a 54 y.o. female.    Patient is here for a skin lesion on the right side of her back that falls off and bleeds from scratching and then it grow back with pain/tender to touch. Patient has an allergy to penicillin and Oseltamivir but denies taking any blood thinners. Patient denies any skin cancers but states theres family h/o of skin cancer. No fevers/chills. Dash.DALLAS GUERRA          Review of Systems   Constitutional:  Negative for chills and fever.   HENT:  Negative for ear pain and sore throat.    Eyes:  Negative for pain and visual disturbance.   Respiratory:  Negative for cough and shortness of breath.    Cardiovascular:  Negative for chest pain and palpitations.   Gastrointestinal:  Negative for abdominal pain and vomiting.   Genitourinary:  Negative for dysuria and hematuria.   Musculoskeletal:  Negative for arthralgias and back pain.   Skin:  Negative for color change and rash.   Neurological:  Negative for seizures and syncope.   All other systems reviewed and are negative.        Objective:      /60 (BP Location: Left arm, Patient Position: Sitting, Cuff Size: Standard)   Pulse 100   Temp (!) 97.1 °F (36.2 °C) (Temporal)   Resp 18   Ht 5' 6.5\" (1.689 m)   Wt 76.2 kg (168 lb)   SpO2 97%   BMI 26.71 kg/m²          Physical " "Exam  Constitutional:       Appearance: She is well-developed.   HENT:      Head: Normocephalic and atraumatic.   Eyes:      Conjunctiva/sclera: Conjunctivae normal.      Pupils: Pupils are equal, round, and reactive to light.   Cardiovascular:      Rate and Rhythm: Normal rate and regular rhythm.   Pulmonary:      Effort: Pulmonary effort is normal.      Breath sounds: Normal breath sounds.   Abdominal:      General: Bowel sounds are normal.      Palpations: Abdomen is soft.   Musculoskeletal:         General: Normal range of motion.      Cervical back: Normal range of motion and neck supple.   Skin:     General: Skin is warm and dry.      Comments: 7 x 10 mm mid right back lesion suspicious for squamous cell carcinoma   Neurological:      Mental Status: She is alert and oriented to person, place, and time.   Psychiatric:         Behavior: Behavior normal.         Thought Content: Thought content normal.         Judgment: Judgment normal.       Skin excision    Date/Time: 4/26/2024 9:30 AM    Performed by: Byran Joel MD  Authorized by: Bryan Joel MD  Universal Protocol:  Consent: Verbal consent obtained.  Risks and benefits: risks, benefits and alternatives were discussed  Consent given by: patient  Time out: Immediately prior to procedure a \"time out\" was called to verify the correct patient, procedure, equipment, support staff and site/side marked as required.  Timeout called at: 4/26/2024 9:52 AM.  Patient understanding: patient states understanding of the procedure being performed  Patient consent: the patient's understanding of the procedure matches consent given  Site marked: the operative site was marked  Patient identity confirmed: verbally with patient    Procedure Details - Skin Excision:     Number of Lesions:  1  Lesion 1:     Body area:  Trunk    Trunk location:  Back    Malignancy: malignancy unknown            Final defect size (mm):  9    Repair type:  Linear closure    Closure " complexity: simple       Repair Comments: Procedure note: With informed verbal consent under sterile conditions using aseptic technique using 1% lidocaine local anesthesia with epinephrine and bicarbonate the 7 x 10 mm right back lesion was sharply excised with a 15 blade and the wound closed primarily with 2 vertical mattress sutures of 3-0 nylon

## 2024-04-26 ENCOUNTER — OFFICE VISIT (OUTPATIENT)
Dept: SURGERY | Facility: CLINIC | Age: 54
End: 2024-04-26
Payer: COMMERCIAL

## 2024-04-26 VITALS
RESPIRATION RATE: 18 BRPM | DIASTOLIC BLOOD PRESSURE: 60 MMHG | TEMPERATURE: 97.1 F | HEIGHT: 67 IN | OXYGEN SATURATION: 97 % | WEIGHT: 168 LBS | HEART RATE: 100 BPM | SYSTOLIC BLOOD PRESSURE: 108 MMHG | BODY MASS INDEX: 26.37 KG/M2

## 2024-04-26 DIAGNOSIS — D48.5 NEOPLASM OF UNCERTAIN BEHAVIOR OF SKIN OF BACK: Primary | ICD-10-CM

## 2024-04-26 PROCEDURE — 99213 OFFICE O/P EST LOW 20 MIN: CPT | Performed by: SURGERY

## 2024-04-26 NOTE — ASSESSMENT & PLAN NOTE
The patient is a pleasant 54-year-old female well-known to the practice presenting with a history of a mid right back lesion suspicious for squamous cell carcinoma for which definitive diagnosis and treatment by excisional biopsy here in the office under local is now indicated.    The options benefits risks and alternatives to the procedure were reviewed with the patient in the presence of her  and informed verbal consent obtained to proceed.

## 2024-05-09 ENCOUNTER — TELEPHONE (OUTPATIENT)
Dept: SURGERY | Facility: CLINIC | Age: 54
End: 2024-05-09

## 2024-05-09 NOTE — PROGRESS NOTES
"Post-Op Note - General Surgery   Adriana Yarbrough 54 y.o. female MRN: [id number]  Encounter: [id number]    Assessment/Plan    Psoriasis  Final path consistent with psoriatic verruca completely excised. No known diagnosis of psoriasis. Advised monitoring for skin rashes and consideration of PCP/Derm follow-up for additional changes in the future. Sutures removed and dressing applied.    Seborrheic keratosis  Has a 1 cm partly raised seborrheic keratosis of upper back amenable to cryotherapy in office today. Completed 30 seconds of therapy in bursts. Tolerated well. Advised monitoring for recurrence of lesion and follow-up as needed for this.  Diagnoses and all orders for this visit:    Psoriasis    Seborrheic keratosis        Subjective      Chief Complaint   Patient presents with    Post-op     2wk suture removal s/p exc on back 4/26/24     Patient is here for a 2wk f/u on suture removal, s/p exc on back 4/26/24. Patient states the inc is healed and denies drainage, pain, redness/ irritation or fevers/chills. Patient notice a new lesion on her upper mid back that not giving her any issues but would like for the doctor to look at it. DALLAS Ramirez         Review of Systems   All other systems reviewed and are negative.      The following portions of the patient's history were reviewed and updated as appropriate: allergies, current medications, past family history, past medical history, past social history, past surgical history, and problem list.    Objective      Blood pressure 140/70, pulse 96, temperature (!) 97.1 °F (36.2 °C), temperature source Temporal, resp. rate 18, height 5' 6.5\" (1.689 m), weight 76.2 kg (168 lb), SpO2 97%.   Physical Exam  Vitals and nursing note reviewed.   Constitutional:       General: She is not in acute distress.     Appearance: She is well-developed. She is not diaphoretic.   HENT:      Head: Normocephalic and atraumatic.   Eyes:      Conjunctiva/sclera: Conjunctivae normal.      Pupils: " Pupils are equal, round, and reactive to light.   Pulmonary:      Effort: No respiratory distress.   Musculoskeletal:         General: Normal range of motion.      Cervical back: Normal range of motion.   Skin:     General: Skin is warm and dry.      Capillary Refill: Capillary refill takes less than 2 seconds.      Comments: Incision C/D/I. Sutures removed. Well healed. Partly raised 1 cm seborrheic keratosis of upper back.   Neurological:      Mental Status: She is alert and oriented to person, place, and time.   Psychiatric:         Behavior: Behavior normal.         Signature:  Roldan Martinez PA-C  Date: 5/10/2024 Time: 9:55 AM

## 2024-05-10 ENCOUNTER — OFFICE VISIT (OUTPATIENT)
Dept: SURGERY | Facility: CLINIC | Age: 54
End: 2024-05-10

## 2024-05-10 VITALS
HEIGHT: 67 IN | BODY MASS INDEX: 26.37 KG/M2 | SYSTOLIC BLOOD PRESSURE: 140 MMHG | HEART RATE: 96 BPM | OXYGEN SATURATION: 97 % | WEIGHT: 168 LBS | DIASTOLIC BLOOD PRESSURE: 70 MMHG | RESPIRATION RATE: 18 BRPM | TEMPERATURE: 97.1 F

## 2024-05-10 DIAGNOSIS — L40.9 PSORIASIS: Primary | ICD-10-CM

## 2024-05-10 DIAGNOSIS — L82.1 SEBORRHEIC KERATOSIS: ICD-10-CM

## 2024-05-10 PROCEDURE — 99024 POSTOP FOLLOW-UP VISIT: CPT | Performed by: PHYSICIAN ASSISTANT

## 2024-05-10 NOTE — ASSESSMENT & PLAN NOTE
Has a 1 cm partly raised seborrheic keratosis of upper back amenable to cryotherapy in office today. Completed 30 seconds of therapy in bursts. Tolerated well. Advised monitoring for recurrence of lesion and follow-up as needed for this.

## 2024-05-10 NOTE — ASSESSMENT & PLAN NOTE
Final path consistent with psoriatic verruca completely excised. No known diagnosis of psoriasis. Advised monitoring for skin rashes and consideration of PCP/Derm follow-up for additional changes in the future. Sutures removed and dressing applied.

## 2024-10-21 ENCOUNTER — HOSPITAL ENCOUNTER (EMERGENCY)
Facility: HOSPITAL | Age: 54
Discharge: HOME | End: 2024-10-21
Attending: EMERGENCY MEDICINE | Admitting: EMERGENCY MEDICINE
Payer: COMMERCIAL

## 2024-10-21 ENCOUNTER — APPOINTMENT (EMERGENCY)
Dept: NON INVASIVE DIAGNOSTICS | Facility: HOSPITAL | Age: 54
End: 2024-10-21
Payer: COMMERCIAL

## 2024-10-21 VITALS
HEART RATE: 103 BPM | OXYGEN SATURATION: 96 % | TEMPERATURE: 98.1 F | RESPIRATION RATE: 18 BRPM | DIASTOLIC BLOOD PRESSURE: 94 MMHG | SYSTOLIC BLOOD PRESSURE: 170 MMHG

## 2024-10-21 DIAGNOSIS — M79.661 RIGHT CALF PAIN: Primary | ICD-10-CM

## 2024-10-21 PROCEDURE — 99283 EMERGENCY DEPT VISIT LOW MDM: CPT

## 2024-10-21 PROCEDURE — 99284 EMERGENCY DEPT VISIT MOD MDM: CPT | Performed by: EMERGENCY MEDICINE

## 2024-10-21 PROCEDURE — 93971 EXTREMITY STUDY: CPT

## 2024-10-21 PROCEDURE — 96372 THER/PROPH/DIAG INJ SC/IM: CPT

## 2024-10-21 RX ORDER — KETOROLAC TROMETHAMINE 30 MG/ML
30 INJECTION, SOLUTION INTRAMUSCULAR; INTRAVENOUS ONCE
Status: COMPLETED | OUTPATIENT
Start: 2024-10-21 | End: 2024-10-21

## 2024-10-21 RX ADMIN — KETOROLAC TROMETHAMINE 30 MG: 30 INJECTION, SOLUTION INTRAMUSCULAR at 15:27

## 2024-10-21 NOTE — Clinical Note
Adriana Yarbrough was seen and treated in our emergency department on 10/21/2024.                Diagnosis:     Adriana  may return to work on return date.    She may return on this date: 10/24/2024         If you have any questions or concerns, please don't hesitate to call.      Mary Williamson MD    ______________________________           _______________          _______________  Hospital Representative                              Date                                Time

## 2024-10-21 NOTE — DISCHARGE INSTRUCTIONS
You were seen in the Emergency Department today for leg pain.    Please follow up with your primary care doctor in 1 week if symptoms do not improve.  Please return to the Emergency Department if you experience worsening of your current symptoms, chest pain, shortness of breath, difficulty walking,  or any other concerning symptoms.

## 2024-10-21 NOTE — ED PROVIDER NOTES
Time reflects when diagnosis was documented in both MDM as applicable and the Disposition within this note       Time User Action Codes Description Comment    10/21/2024  4:03 PM Mary Williamson Add [M79.604] Right leg pain     10/21/2024  4:03 PM Mary Williamson Remove [M79.604] Right leg pain     10/21/2024  4:04 PM Mary Williamson Add [M79.661] Right calf pain           ED Disposition       ED Disposition   Discharge    Condition   Stable    Date/Time   Mon Oct 21, 2024  4:03 PM    Comment   Adriana Yarbrough discharge to home/self care.                   Assessment & Plan       Medical Decision Making  Patient presents for evaluation of right lower extremity pain.  Differential includes DVT versus musculoskeletal strain.  Will order duplex.     Duplex is negative. Symptoms likely in setting of MSK strain. Discussed findings with patient. She was told to follow-up with PCP. She was given return precautions and discharged in stable condition.     Risk  Prescription drug management.        ED Course as of 10/21/24 2037   Mon Oct 21, 2024   1603 Duplex negative       Medications   ketorolac (TORADOL) injection 30 mg (30 mg Intramuscular Given 10/21/24 1527)       ED Risk Strat Scores                           SBIRT 22yo+      Flowsheet Row Most Recent Value   Initial Alcohol Screen: US AUDIT-C     1. How often do you have a drink containing alcohol? 0 Filed at: 10/21/2024 1458   2. How many drinks containing alcohol do you have on a typical day you are drinking?  0 Filed at: 10/21/2024 1458   3a. Male UNDER 65: How often do you have five or more drinks on one occasion? 0 Filed at: 10/21/2024 1458   3b. FEMALE Any Age, or MALE 65+: How often do you have 4 or more drinks on one occassion? 0 Filed at: 10/21/2024 1458   Audit-C Score 0 Filed at: 10/21/2024 1458   DINO: How many times in the past year have you...    Used an illegal drug or used a prescription medication for non-medical reasons? Never Filed at: 10/21/2024 1458                             History of Present Illness       Chief Complaint   Patient presents with    Leg Pain     Patient presents with right leg pain and swelling for 2 weeks. Concerned it may be a blood clot.        Patient is a 54-year-old female with no significant history who presents with right lower extremity pain.  Patient reports that for the past 3 weeks she has right lower extremity pain that has progressively worsened.  Today while she was at work she was kneeling down but was unable to continue working because of the pain.  It is worse with dorsiflexion of her right foot.  She is able to ambulate.  She denies recent trauma to her leg.  She denies recent travel, history of blood clots, recent surgery, recent cancer diagnosis. She denies chest pain and shortness of breath.    Past Medical History:   Diagnosis Date    Graves disease       Past Surgical History:   Procedure Laterality Date    CHOLECYSTECTOMY LAPAROSCOPIC N/A 8/7/2022    Procedure: CHOLECYSTECTOMY LAPAROSCOPIC;  Surgeon: Bryan Joel MD;  Location: [facility] OR;  Service: General    DENTAL SURGERY      ENDOMETRIAL ABLATION      HAND SURGERY Right     TONSILLECTOMY        Family History   Problem Relation Age of Onset    Hypertension Mother     Arthritis Mother     Diabetes Mother     Cholecystitis Mother     Hepatitis Mother         C    Arthritis Father     Cancer Father         prostate      Social History     Tobacco Use    Smoking status: Every Day     Current packs/day: 0.50     Types: Cigarettes    Smokeless tobacco: Never   Vaping Use    Vaping status: Never Used   Substance Use Topics    Alcohol use: Never    Drug use: Never      E-Cigarette/Vaping    E-Cigarette Use Never User       E-Cigarette/Vaping Substances    Nicotine No     THC No     CBD No     Flavoring No     Other No     Unknown No       I have reviewed and agree with the history as documented.     HPI    Review of Systems   Constitutional:  Negative for chills and fever.    Cardiovascular:  Negative for chest pain and palpitations.   Neurological:  Negative for weakness and numbness.   All other systems reviewed and are negative.          Objective       ED Triage Vitals [10/21/24 1457]   Temperature Pulse Blood Pressure Respirations SpO2 Patient Position - Orthostatic VS   98.1 °F (36.7 °C) 103 170/94 18 96 % --      Temp Source Heart Rate Source BP Location FiO2 (%) Pain Score    Temporal Monitor -- -- 6      Vitals      Date and Time Temp Pulse SpO2 Resp BP Pain Score FACES Pain Rating User   10/21/24 1457 98.1 °F (36.7 °C) 103 96 % 18 170/94 6 -- RC            Physical Exam  Vitals and nursing note reviewed.   HENT:      Head: Normocephalic and atraumatic.      Nose: No congestion or rhinorrhea.      Mouth/Throat:      Mouth: Mucous membranes are moist.   Eyes:      General:         Right eye: No discharge.         Left eye: No discharge.      Extraocular Movements: Extraocular movements intact.   Cardiovascular:      Rate and Rhythm: Normal rate and regular rhythm.   Pulmonary:      Effort: Pulmonary effort is normal. No respiratory distress.   Musculoskeletal:      Cervical back: Normal range of motion.      Comments: TTP right posterior calf. Mildly swollen compared to LLE. No erythema or ecchymosis. Pain with dorsiflexion. 2+ DP and PT pulses.    Skin:     General: Skin is warm and dry.      Capillary Refill: Capillary refill takes less than 2 seconds.   Neurological:      Mental Status: She is alert.      Sensory: No sensory deficit.      Motor: No weakness.   Psychiatric:         Mood and Affect: Mood normal.         Results Reviewed       None            VAS lower limb venous duplex study, unilateral/limited    (Results Pending)       Procedures    ED Medication and Procedure Management   Prior to Admission Medications   Prescriptions Last Dose Informant Patient Reported? Taking?   Levothyroxine Sodium 137 MCG CAPS  Self Yes No   Sig: Take 1 capsule by mouth daily in the  early morning With the 25 to equal 142mcg   atorvastatin (LIPITOR) 40 mg tablet  Self Yes No   Sig: Take 1 tablet by mouth daily with dinner   estradiol (Yuvafem) 10 MCG TABS vaginal tablet  Self No No   Sig: Insert 1 tablet (10 mcg total) into the vagina 2 (two) times a week   guaiFENesin (MUCINEX) 600 mg 12 hr tablet  Self Yes No   Sig: Take by mouth   sertraline (ZOLOFT) 50 mg tablet  Self Yes No   Sig: Take 50 mg by mouth daily      Facility-Administered Medications: None     Discharge Medication List as of 10/21/2024  4:04 PM        CONTINUE these medications which have NOT CHANGED    Details   atorvastatin (LIPITOR) 40 mg tablet Take 1 tablet by mouth daily with dinner, Starting Wed 3/30/2016, Historical Med      estradiol (Yuvafem) 10 MCG TABS vaginal tablet Insert 1 tablet (10 mcg total) into the vagina 2 (two) times a week, Starting Mon 4/15/2024, Normal      guaiFENesin (MUCINEX) 600 mg 12 hr tablet Take by mouth, Historical Med      Levothyroxine Sodium 137 MCG CAPS Take 1 capsule by mouth daily in the early morning With the 25 to equal 142mcg, Historical Med      sertraline (ZOLOFT) 50 mg tablet Take 50 mg by mouth daily, Historical Med           No discharge procedures on file.  ED SEPSIS DOCUMENTATION   Time reflects when diagnosis was documented in both MDM as applicable and the Disposition within this note       Time User Action Codes Description Comment    10/21/2024  4:03 PM Mary Williamson [M79.604] Right leg pain     10/21/2024  4:03 PM Mary Williamson Remove [M79.604] Right leg pain     10/21/2024  4:04 PM Mary Williamson [M79.661] Right calf pain                  Mary Williamson MD  10/21/24 2037       Mary Williamson MD  10/21/24 2037

## 2024-10-21 NOTE — ED ATTENDING ATTESTATION
10/21/2024  I, Porter York MD, saw and evaluated the patient. I have discussed the patient with the resident/non-physician practitioner and agree with the resident's/non-physician practitioner's findings, Plan of Care, and MDM as documented in the resident's/non-physician practitioner's note, except where noted. All available labs and Radiology studies were reviewed.  I was present for key portions of any procedure(s) performed by the resident/non-physician practitioner and I was immediately available to provide assistance.       At this point I agree with the current assessment done in the Emergency Department.  I have conducted an independent evaluation of this patient a history and physical is as follows:        Patient is a 54-year-old female who presents for evaluation of right calf pain over the past several weeks.  He does a lot of kneeling and movement in her job and that seems to worsen her pain.  Patient with some calf tenderness on exam.  No significant swelling or erythema noted.  Neurovascularly intact.  Duplex negative, likely muscle strain.  Advised abortive measures and strict return precautions.  ED Course  ED Course as of 10/21/24 1831   Mon Oct 21, 2024   1510 RLE pain, 3 weeks.          Critical Care Time  Procedures

## 2024-10-22 PROCEDURE — 93971 EXTREMITY STUDY: CPT | Performed by: SURGERY

## 2024-11-21 NOTE — PROGRESS NOTES
Name: Adriana Yarbrough      : 1970      MRN: [id number]  Encounter Provider: Bryan Joel MD  Encounter Date: 2024   Encounter department: [facility] [place]  :  Assessment & Plan  Ganglion cyst of both feet  The patient is a pleasant 54-year-old female well-known to the practice presenting with symptomatic ganglion cysts of both feet most notably the right foot for which she desires definitive treatment.    I have advised referral to podiatry for definitive diagnosis and treatment of her bilateral foot lumps.    Orders:    Ambulatory Referral to Podiatry; Future        History of Present Illness     HPI  Adriana Yarbrough is a 54 y.o. female who presents with a cyst on both foot that she has been dealing with for 10 years. Patient is more worry about the one on the right because its bigger. She has a job that requires her to be on her knees and the cyst rub against the floor causing swelling and irritation. Patient has allergies to Penicillin and Oseltamirvir but denies any blood thinners. DALLAS Ramirez   History obtained from: patient    Review of Systems   Constitutional:  Negative for chills and fever.   HENT:  Negative for ear pain and sore throat.    Eyes:  Negative for pain and visual disturbance.   Respiratory:  Negative for cough and shortness of breath.    Cardiovascular:  Negative for chest pain and palpitations.   Gastrointestinal:  Negative for abdominal pain and vomiting.   Genitourinary:  Negative for dysuria and hematuria.   Musculoskeletal:  Negative for arthralgias and back pain.   Skin:  Negative for color change and rash.   Neurological:  Negative for seizures and syncope.   All other systems reviewed and are negative.    Pertinent Medical History         Medical History Reviewed by provider this encounter:     .  Past Medical History   Past Medical History:   Diagnosis Date    Graves disease      Past Surgical History:   Procedure Laterality Date    CHOLECYSTECTOMY  LAPAROSCOPIC N/A 8/7/2022    Procedure: CHOLECYSTECTOMY LAPAROSCOPIC;  Surgeon: Bryan Joel MD;  Location: [facility];  Service: General    DENTAL SURGERY      ENDOMETRIAL ABLATION      HAND SURGERY Right     TONSILLECTOMY       Family History   Problem Relation Age of Onset    Hypertension Mother     Arthritis Mother     Diabetes Mother     Cholecystitis Mother     Hepatitis Mother         C    Arthritis Father     Cancer Father         prostate      reports that she has been smoking. She has never used smokeless tobacco. She reports that she does not drink alcohol and does not use drugs.  Current Outpatient Medications on File Prior to Visit   Medication Sig Dispense Refill    atorvastatin (LIPITOR) 40 mg tablet Take 1 tablet by mouth daily with dinner      estradiol (Yuvafem) 10 MCG TABS vaginal tablet Insert 1 tablet (10 mcg total) into the vagina 2 (two) times a week 24 tablet 3    guaiFENesin (MUCINEX) 600 mg 12 hr tablet Take by mouth      Levothyroxine Sodium 137 MCG CAPS Take 1 capsule by mouth daily in the early morning With the 25 to equal 142mcg      sertraline (ZOLOFT) 50 mg tablet Take 50 mg by mouth daily       No current facility-administered medications on file prior to visit.     Allergies   Allergen Reactions    Oseltamivir Other (See Comments) and Hallucinations     Pt reports she gets very sick    Penicillins Other (See Comments)    Pollen Extract Cough and Sneezing      Current Outpatient Medications on File Prior to Visit   Medication Sig Dispense Refill    atorvastatin (LIPITOR) 40 mg tablet Take 1 tablet by mouth daily with dinner      estradiol (Yuvafem) 10 MCG TABS vaginal tablet Insert 1 tablet (10 mcg total) into the vagina 2 (two) times a week 24 tablet 3    guaiFENesin (MUCINEX) 600 mg 12 hr tablet Take by mouth      Levothyroxine Sodium 137 MCG CAPS Take 1 capsule by mouth daily in the early morning With the 25 to equal 142mcg      sertraline (ZOLOFT) 50 mg tablet Take 50 mg by  "mouth daily       No current facility-administered medications on file prior to visit.      Social History     Tobacco Use    Smoking status: Every Day     Current packs/day: 0.50     Types: Cigarettes    Smokeless tobacco: Never   Vaping Use    Vaping status: Never Used   Substance and Sexual Activity    Alcohol use: Never    Drug use: Never    Sexual activity: Not on file        Objective   /60 (BP Location: Left arm, Patient Position: Sitting, Cuff Size: Standard)   Pulse 88   Temp (!) 97.2 °F (36.2 °C) (Temporal)   Ht 5' 6.5\" (1.689 m)   Wt 71.7 kg (158 lb)   SpO2 98%   BMI 25.12 kg/m²      Physical Exam  Vitals and nursing note reviewed.   Constitutional:       General: She is not in acute distress.     Appearance: She is well-developed.   HENT:      Head: Normocephalic and atraumatic.   Eyes:      Conjunctiva/sclera: Conjunctivae normal.   Cardiovascular:      Rate and Rhythm: Normal rate and regular rhythm.      Heart sounds: No murmur heard.  Pulmonary:      Effort: Pulmonary effort is normal. No respiratory distress.      Breath sounds: Normal breath sounds.   Abdominal:      Palpations: Abdomen is soft.      Tenderness: There is no abdominal tenderness.   Musculoskeletal:         General: No swelling.      Cervical back: Neck supple.      Comments: Ganglion cyst present on both feet right greater than left   Skin:     General: Skin is warm and dry.      Capillary Refill: Capillary refill takes less than 2 seconds.   Neurological:      Mental Status: She is alert.   Psychiatric:         Mood and Affect: Mood normal.         Administrative Statements   I have spent a total time of 15 minutes in caring for this patient on the day of the visit/encounter including Counseling / Coordination of care. Topics discussed with the patient / family include goals of care.  "

## 2024-11-22 ENCOUNTER — OFFICE VISIT (OUTPATIENT)
Dept: SURGERY | Facility: CLINIC | Age: 54
End: 2024-11-22
Payer: COMMERCIAL

## 2024-11-22 VITALS
OXYGEN SATURATION: 98 % | BODY MASS INDEX: 24.8 KG/M2 | SYSTOLIC BLOOD PRESSURE: 140 MMHG | HEIGHT: 67 IN | WEIGHT: 158 LBS | HEART RATE: 88 BPM | TEMPERATURE: 97.2 F | DIASTOLIC BLOOD PRESSURE: 60 MMHG

## 2024-11-22 DIAGNOSIS — M67.471 GANGLION CYST OF BOTH FEET: Primary | ICD-10-CM

## 2024-11-22 DIAGNOSIS — M67.472 GANGLION CYST OF BOTH FEET: Primary | ICD-10-CM

## 2024-11-22 PROCEDURE — 99213 OFFICE O/P EST LOW 20 MIN: CPT | Performed by: SURGERY

## 2024-11-22 NOTE — ASSESSMENT & PLAN NOTE
The patient is a pleasant 54-year-old female well-known to the practice presenting with symptomatic ganglion cysts of both feet most notably the right foot for which she desires definitive treatment.    I have advised referral to podiatry for definitive diagnosis and treatment of her bilateral foot lumps.    Orders:    Ambulatory Referral to Podiatry; Future

## 2024-12-03 ENCOUNTER — OFFICE VISIT (OUTPATIENT)
Age: 54
End: 2024-12-03
Payer: COMMERCIAL

## 2024-12-03 VITALS
HEIGHT: 67 IN | WEIGHT: 158 LBS | HEART RATE: 86 BPM | SYSTOLIC BLOOD PRESSURE: 145 MMHG | BODY MASS INDEX: 24.8 KG/M2 | DIASTOLIC BLOOD PRESSURE: 83 MMHG

## 2024-12-03 DIAGNOSIS — M79.671 PAIN IN BOTH FEET: ICD-10-CM

## 2024-12-03 DIAGNOSIS — M67.472 GANGLION CYST OF BOTH FEET: ICD-10-CM

## 2024-12-03 DIAGNOSIS — M67.471 GANGLION CYST OF BOTH FEET: ICD-10-CM

## 2024-12-03 DIAGNOSIS — M79.672 PAIN IN BOTH FEET: ICD-10-CM

## 2024-12-03 DIAGNOSIS — M67.40 GANGLION CYST: Primary | ICD-10-CM

## 2024-12-03 PROCEDURE — 99203 OFFICE O/P NEW LOW 30 MIN: CPT | Performed by: PODIATRIST

## 2024-12-03 PROCEDURE — 10060 I&D ABSCESS SIMPLE/SINGLE: CPT | Performed by: PODIATRIST

## 2024-12-03 PROCEDURE — 10160 PNXR ASPIR ABSC HMTMA BULLA: CPT | Performed by: PODIATRIST

## 2024-12-03 NOTE — PROGRESS NOTES
Name: Adriana Yarbrough      : 1970      MRN: [id number]  Encounter Provider: Jasbir Lentz DPM  Encounter Date: 12/3/2024   Encounter department: [facility] [place]  :  Assessment & Plan  Ganglion cyst    Orders:    XR foot 3+ vw right; Future    XR foot 3+ vw left; Future    Incision and Drainage    MRI ankle/heel left w wo contrast; Future    Ganglion cyst of both feet    Orders:    Ambulatory Referral to Podiatry    Incision and Drainage    MRI ankle/heel left w wo contrast; Future    Pain in both feet    Orders:    MRI ankle/heel left w wo contrast; Future    -X-rays 3 views taken view of the bilateral feet and do not show any signs of cyst or noticeable soft tissue swelling  - We will aspirate her left foot today and attempt to see if we can avoid surgical intervention.  We did discuss recurrence rate and further workup  - Aspiration performed as below  - She is to return in 1 month for repeat assessment and if this does recur we will send her for an MRI for presurgical planning    Incision and Drainage    Date/Time: 12/3/2024 3:00 PM    Performed by: Jasbir Lentz DPM  Authorized by: Jasbir Lentz DPM  Universal Protocol:  Consent: Verbal consent obtained.  Risks and benefits: risks, benefits and alternatives were discussed  Consent given by: patient  Patient understanding: patient states understanding of the procedure being performed  Patient consent: the patient's understanding of the procedure matches consent given  Patient identity confirmed: verbally with patient    Patient location:  Clinic  Location:     Type:  Cyst    Location:  Lower extremity    Lower extremity location:  L foot  Pre-procedure details:     Skin preparation:  Betadine  Anesthesia (see MAR for exact dosages):     Anesthesia method:  Topical application  Procedure details:     Complexity:  Simple    Needle aspiration: yes      Needle size:  18 G    Incision types:   Stab incision    Aspiration type: puncture aspiration      Approach:  Puncture    Incision depth:  Subcutaneous    Drainage characteristics: No drainage was able to be obtained, the cyst was ensured to be entered we with negative pressure we could not withdraw any fluid.    Packing materials:  None  Post-procedure details:     Patient tolerance of procedure:  Tolerated well, no immediate complications        History of Present Illness     HPI  Adriana Yarbrough is a 54 y.o. female who presents evaluation management of her bilateral feet.  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  She has had the cyst that she is complaining of for multiple years but the symptoms are getting worse.  She would like to have them dealt with      Review of Systems   Constitutional:  Negative for chills and fever.   HENT:  Negative for ear pain and sore throat.    Eyes:  Negative for pain and visual disturbance.   Respiratory:  Negative for cough and shortness of breath.    Cardiovascular:  Negative for chest pain and palpitations.   Gastrointestinal:  Negative for abdominal pain and vomiting.   Genitourinary:  Negative for dysuria and hematuria.   Musculoskeletal:  Negative for arthralgias and back pain.   Skin:  Negative for color change and rash.   Neurological:  Negative for seizures and syncope.   All other systems reviewed and are negative.    Past Medical History   Past Medical History:   Diagnosis Date    Graves disease      Past Surgical History:   Procedure Laterality Date    CHOLECYSTECTOMY LAPAROSCOPIC N/A 8/7/2022    Procedure: CHOLECYSTECTOMY LAPAROSCOPIC;  Surgeon: Bryan Joel MD;  Location: [facility];  Service: General    DENTAL SURGERY      ENDOMETRIAL ABLATION      HAND SURGERY Right     TONSILLECTOMY       Family History   Problem Relation Age of Onset    Hypertension Mother     Arthritis Mother     Diabetes Mother     Cholecystitis Mother     Hepatitis Mother   "       C    Arthritis Father     Cancer Father         prostate      reports that she has been smoking. She has never used smokeless tobacco. She reports that she does not drink alcohol and does not use drugs.  Current Outpatient Medications on File Prior to Visit   Medication Sig Dispense Refill    atorvastatin (LIPITOR) 40 mg tablet Take 1 tablet by mouth daily with dinner      estradiol (Yuvafem) 10 MCG TABS vaginal tablet Insert 1 tablet (10 mcg total) into the vagina 2 (two) times a week 24 tablet 3    guaiFENesin (MUCINEX) 600 mg 12 hr tablet Take by mouth      Levothyroxine Sodium 137 MCG CAPS Take 1 capsule by mouth daily in the early morning With the 25 to equal 142mcg      sertraline (ZOLOFT) 50 mg tablet Take 50 mg by mouth daily       No current facility-administered medications on file prior to visit.     Allergies   Allergen Reactions    Oseltamivir Other (See Comments) and Hallucinations     Pt reports she gets very sick    Penicillins Other (See Comments)    Pollen Extract Cough and Sneezing           Objective   /83 (BP Location: Right arm, Patient Position: Sitting, Cuff Size: Large)   Pulse 86   Ht 5' 6.5\" (1.689 m)   Wt 71.7 kg (158 lb)   BMI 25.12 kg/m²      Physical Exam  Skin:     Comments: Bilateral symmetric ganglion cysts that are emanating from the likely tarsal joints of her rear foot.  They seem to be very well-circumscribed and slightly mobile I can palpate the overall sac pulses are palpable slight cavus foot with a little bit of met adductus decreased subtalar joint range of motion bilaterally           "

## 2024-12-03 NOTE — ASSESSMENT & PLAN NOTE
Orders:    Ambulatory Referral to Podiatry    Incision and Drainage    MRI ankle/heel left w wo contrast; Future

## 2024-12-13 ENCOUNTER — HOSPITAL ENCOUNTER (OUTPATIENT)
Dept: MRI IMAGING | Facility: HOSPITAL | Age: 54
End: 2024-12-13
Attending: PODIATRIST
Payer: COMMERCIAL

## 2024-12-13 DIAGNOSIS — M67.471 GANGLION CYST OF BOTH FEET: ICD-10-CM

## 2024-12-13 DIAGNOSIS — M67.472 GANGLION CYST OF BOTH FEET: ICD-10-CM

## 2024-12-13 DIAGNOSIS — M79.672 PAIN IN BOTH FEET: ICD-10-CM

## 2024-12-13 DIAGNOSIS — M67.40 GANGLION CYST: ICD-10-CM

## 2024-12-13 DIAGNOSIS — M79.671 PAIN IN BOTH FEET: ICD-10-CM

## 2024-12-13 PROCEDURE — A9585 GADOBUTROL INJECTION: HCPCS | Performed by: PODIATRIST

## 2024-12-13 PROCEDURE — 73723 MRI JOINT LWR EXTR W/O&W/DYE: CPT

## 2024-12-13 RX ORDER — GADOBUTROL 604.72 MG/ML
7 INJECTION INTRAVENOUS
Status: COMPLETED | OUTPATIENT
Start: 2024-12-13 | End: 2024-12-13

## 2024-12-13 RX ADMIN — GADOBUTROL 7 ML: 604.72 INJECTION INTRAVENOUS at 11:48

## 2024-12-26 ENCOUNTER — OFFICE VISIT (OUTPATIENT)
Age: 54
End: 2024-12-26
Payer: COMMERCIAL

## 2024-12-26 ENCOUNTER — PREP FOR PROCEDURE (OUTPATIENT)
Age: 54
End: 2024-12-26

## 2024-12-26 VITALS — HEIGHT: 67 IN | WEIGHT: 158 LBS | BODY MASS INDEX: 24.8 KG/M2

## 2024-12-26 DIAGNOSIS — M67.40 GANGLION CYST: Primary | ICD-10-CM

## 2024-12-26 PROCEDURE — 99214 OFFICE O/P EST MOD 30 MIN: CPT | Performed by: PODIATRIST

## 2024-12-26 RX ORDER — CHLORHEXIDINE GLUCONATE 40 MG/ML
SOLUTION TOPICAL DAILY PRN
OUTPATIENT
Start: 2024-12-26

## 2024-12-26 RX ORDER — CHLORHEXIDINE GLUCONATE ORAL RINSE 1.2 MG/ML
15 SOLUTION DENTAL ONCE
OUTPATIENT
Start: 2024-12-26 | End: 2024-12-26

## 2024-12-26 NOTE — PROGRESS NOTES
Name: Adriana Yarbrough      : 1970      MRN: [id number]  Encounter Provider: Jasbir Lentz DPM  Encounter Date: 2024   Encounter department: [facility]ATRY [facility]  :  Assessment & Plan  Ganglion cyst         MRI reviewed and does show a small cyst-present under the marker  - She would like to have a definitive solution and we will pursue a removal  - She will be weight-bear as tolerated status post intervention in her foot in surgical shoe she will need appointments at 1 week, 2 weeks and 6 weeks.  - Anticipate suture removal at 2 weeks  - She will likely need similar intervention on the right foot  - We discussed potential for recurrence but hopefully pathology can help us identify this lesion  - Discussed numbness to the incisional area as well    Patient was counseled and educated on the condition and the diagnosis. The diagnosis, treatment options and prognosis were discussed with the patient.  The patient failed conservative care at this time and wished to proceed with surgical treatment.  Explained surgical details and post-op course.  Discussed all risks and complications related to the patient's condition and surgery.  The benefits of surgery were also discussed.  The patient understood that the surgery would not guarantee desirable outcome.  All questions and concerns were addressed and the consent was signed.        History of Present Illness   HPI  Adriana Yarbrough is a 54 y.o. female who presents evaluation management of her left foot.  She is also having similar process on her right foot.  She is here to review MRI and consider surgical intervention.  She would like this thing permanently gone      Review of Systems   Constitutional:  Negative for chills and fever.   HENT:  Negative for ear pain and sore throat.    Eyes:  Negative for pain and visual disturbance.   Respiratory:  Negative for cough and shortness of breath.    Cardiovascular:  Negative for  "chest pain and palpitations.   Gastrointestinal:  Negative for abdominal pain and vomiting.   Genitourinary:  Negative for dysuria and hematuria.   Musculoskeletal:  Negative for arthralgias and back pain.   Skin:  Negative for color change and rash.   Neurological:  Negative for seizures and syncope.   All other systems reviewed and are negative.         Objective   Ht 5' 6.5\" (1.689 m)   Wt 71.7 kg (158 lb)   BMI 25.12 kg/m²      Physical Exam  Vitals reviewed.   Constitutional:       Appearance: Normal appearance.   HENT:      Head: Normocephalic and atraumatic.   Skin:     Comments: No change in physical exam.  Well-circumscribed graft dorsal lateral aspect of bilateral feet.  Mobile and soft tissues   Neurological:      Mental Status: She is alert.           "

## 2025-01-03 ENCOUNTER — ANESTHESIA EVENT (OUTPATIENT)
Dept: PERIOP | Facility: HOSPITAL | Age: 55
End: 2025-01-03
Payer: COMMERCIAL

## 2025-01-03 ENCOUNTER — TELEPHONE (OUTPATIENT)
Dept: PODIATRY | Facility: CLINIC | Age: 55
End: 2025-01-03

## 2025-01-03 NOTE — TELEPHONE ENCOUNTER
Patient called in with concerns about sx iv antibiotics. She has a penicillin allergy and is worried about medication containing med. I assured her, her allergies will be properly addressed and any concerns she has to please bring them up to give her peace of mind. Thank you

## 2025-01-03 NOTE — PRE-PROCEDURE INSTRUCTIONS
Pre-Surgery Instructions:   Medication Instructions    atorvastatin (LIPITOR) 40 mg tablet Take night before surgery    cyanocobalamin (VITAMIN B-12) 500 MCG tablet Stop taking 3 days prior to surgery.    estradiol (Yuvafem) 10 MCG TABS vaginal tablet Hold day of surgery.    guaiFENesin (MUCINEX) 600 mg 12 hr tablet Take night before surgery    Levothyroxine Sodium 137 MCG CAPS Take day of surgery.    sertraline (ZOLOFT) 50 mg tablet Take night before surgery     Medication instructions for day surgery reviewed. Please use only a sip of water to take your instructed medications. Avoid all over the counter vitamins, supplements and NSAIDS for one week prior to surgery per anesthesia guidelines. Tylenol is ok to take as needed.     You will receive a call one business day prior to surgery with an arrival time and hospital directions. If your surgery is scheduled on a Monday, the hospital will be calling you on the Friday prior to your surgery. If you have not heard from anyone by 8pm, please call the hospital supervisor through the hospital  at [phone]. ([place] [phone] or [place] [phone]).    Do not eat or drink anything after midnight the night before your surgery, including candy, mints, lifesavers, or chewing gum. Do not drink alcohol 24hrs before your surgery. Try not to smoke at least 24hrs before your surgery.       Follow the pre surgery showering instructions as listed in the “My Surgical Experience Booklet” or otherwise provided by your surgeon's office. Do not use a blade to shave the surgical area 1 week before surgery. It is okay to use a clean electric clippers up to 24 hours before surgery. Do not apply any lotions, creams, including makeup, cologne, deodorant, or perfumes after showering on the day of your surgery. Do not use dry shampoo, hair spray, hair gel, or any type of hair products.     No contact lenses, eye make-up, or artificial eyelashes. Remove nail polish,  including gel polish, and any artificial, gel, or acrylic nails if possible. Remove all jewelry including rings and body piercing jewelry.     Wear causal clothing that is easy to take on and off. Consider your type of surgery.    Keep any valuables, jewelry, piercings at home. Please bring any specially ordered equipment (sling, braces) if indicated.    Arrange for a responsible person to drive you to and from the hospital on the day of your surgery. Please confirm the visitor policy for the day of your procedure when you receive your phone call with an arrival time.     Call the surgeon's office with any new illnesses, exposures, or additional questions prior to surgery.    Please reference your “My Surgical Experience Booklet” for additional information to prepare for your upcoming surgery.

## 2025-01-05 NOTE — ANESTHESIA PREPROCEDURE EVALUATION
Procedure:  EXCISION BIOPSY TISSUE LESION/MASS LOWER EXTREMITY (Left: Foot)    ECG: NSR with T wave abnormality     Asthma- triggered by cold, well controlled no recent exacerbations    Denies the following: CP/SOB with exertion, COPD, KELVIN, stroke/TIA, seizure    Relevant Problems   NEURO/PSYCH   (+) Anxiety      PULMONARY   (+) Smoking        Physical Exam    Airway    Mallampati score: I  TM Distance: >3 FB  Neck ROM: full     Dental    upper dentures and lower dentures    Cardiovascular      Pulmonary      Other Findings  post-pubertal.      Anesthesia Plan  ASA Score- 2     Anesthesia Type- IV sedation with anesthesia with ASA Monitors.         Additional Monitors:     Airway Plan:            Plan Factors-Exercise tolerance (METS): >4 METS.    Chart reviewed. EKG reviewed.  Existing labs reviewed. Patient summary reviewed.    Patient is not a current smoker.      Obstructive sleep apnea risk education given perioperatively.        Induction-     Postoperative Plan-         Informed Consent- Anesthetic plan and risks discussed with patient.  I personally reviewed this patient with the CRNA. Discussed and agreed on the Anesthesia Plan with the CRNA..

## 2025-01-06 ENCOUNTER — HOSPITAL ENCOUNTER (OUTPATIENT)
Facility: HOSPITAL | Age: 55
Setting detail: OUTPATIENT SURGERY
Discharge: HOME | End: 2025-01-06
Attending: PODIATRIST | Admitting: PODIATRIST
Payer: COMMERCIAL

## 2025-01-06 ENCOUNTER — ANESTHESIA (OUTPATIENT)
Dept: PERIOP | Facility: HOSPITAL | Age: 55
End: 2025-01-06
Payer: COMMERCIAL

## 2025-01-06 VITALS
WEIGHT: 158 LBS | RESPIRATION RATE: 21 BRPM | OXYGEN SATURATION: 96 % | HEART RATE: 68 BPM | TEMPERATURE: 97.7 F | HEIGHT: 66 IN | BODY MASS INDEX: 25.39 KG/M2 | DIASTOLIC BLOOD PRESSURE: 85 MMHG | SYSTOLIC BLOOD PRESSURE: 126 MMHG

## 2025-01-06 DIAGNOSIS — Z98.890 POST-OPERATIVE STATE: Primary | ICD-10-CM

## 2025-01-06 DIAGNOSIS — M67.40 GANGLION CYST: ICD-10-CM

## 2025-01-06 PROCEDURE — 28090 REMOVAL OF FOOT LESION: CPT | Performed by: PODIATRIST

## 2025-01-06 PROCEDURE — 88304 TISSUE EXAM BY PATHOLOGIST: CPT | Performed by: PATHOLOGY

## 2025-01-06 PROCEDURE — 99024 POSTOP FOLLOW-UP VISIT: CPT | Performed by: PODIATRIST

## 2025-01-06 RX ORDER — FENTANYL CITRATE/PF 50 MCG/ML
25 SYRINGE (ML) INJECTION
Status: DISCONTINUED | OUTPATIENT
Start: 2025-01-06 | End: 2025-01-06 | Stop reason: HOSPADM

## 2025-01-06 RX ORDER — SODIUM CHLORIDE, SODIUM LACTATE, POTASSIUM CHLORIDE, CALCIUM CHLORIDE 600; 310; 30; 20 MG/100ML; MG/100ML; MG/100ML; MG/100ML
INJECTION, SOLUTION INTRAVENOUS CONTINUOUS PRN
Status: DISCONTINUED | OUTPATIENT
Start: 2025-01-06 | End: 2025-01-06

## 2025-01-06 RX ORDER — OXYCODONE AND ACETAMINOPHEN 5; 325 MG/1; MG/1
1 TABLET ORAL EVERY 4 HOURS PRN
Refills: 0 | Status: DISCONTINUED | OUTPATIENT
Start: 2025-01-06 | End: 2025-01-06 | Stop reason: HOSPADM

## 2025-01-06 RX ORDER — FENTANYL CITRATE 50 UG/ML
INJECTION, SOLUTION INTRAMUSCULAR; INTRAVENOUS AS NEEDED
Status: DISCONTINUED | OUTPATIENT
Start: 2025-01-06 | End: 2025-01-06

## 2025-01-06 RX ORDER — CLINDAMYCIN PHOSPHATE 900 MG/50ML
900 INJECTION, SOLUTION INTRAVENOUS ONCE
Status: COMPLETED | OUTPATIENT
Start: 2025-01-06 | End: 2025-01-06

## 2025-01-06 RX ORDER — LIDOCAINE HYDROCHLORIDE 10 MG/ML
INJECTION, SOLUTION EPIDURAL; INFILTRATION; INTRACAUDAL; PERINEURAL AS NEEDED
Status: DISCONTINUED | OUTPATIENT
Start: 2025-01-06 | End: 2025-01-06 | Stop reason: HOSPADM

## 2025-01-06 RX ORDER — MAGNESIUM HYDROXIDE 1200 MG/15ML
LIQUID ORAL AS NEEDED
Status: DISCONTINUED | OUTPATIENT
Start: 2025-01-06 | End: 2025-01-06 | Stop reason: HOSPADM

## 2025-01-06 RX ORDER — HYDROMORPHONE HCL/PF 1 MG/ML
0.5 SYRINGE (ML) INJECTION
Status: DISCONTINUED | OUTPATIENT
Start: 2025-01-06 | End: 2025-01-06 | Stop reason: HOSPADM

## 2025-01-06 RX ORDER — CHLORHEXIDINE GLUCONATE ORAL RINSE 1.2 MG/ML
15 SOLUTION DENTAL ONCE
Status: COMPLETED | OUTPATIENT
Start: 2025-01-06 | End: 2025-01-06

## 2025-01-06 RX ORDER — MIDAZOLAM HYDROCHLORIDE 2 MG/2ML
INJECTION, SOLUTION INTRAMUSCULAR; INTRAVENOUS AS NEEDED
Status: DISCONTINUED | OUTPATIENT
Start: 2025-01-06 | End: 2025-01-06

## 2025-01-06 RX ORDER — ALBUTEROL SULFATE 0.83 MG/ML
2.5 SOLUTION RESPIRATORY (INHALATION) ONCE AS NEEDED
Status: DISCONTINUED | OUTPATIENT
Start: 2025-01-06 | End: 2025-01-06 | Stop reason: HOSPADM

## 2025-01-06 RX ORDER — OXYCODONE AND ACETAMINOPHEN 5; 325 MG/1; MG/1
1 TABLET ORAL EVERY 6 HOURS PRN
Qty: 15 TABLET | Refills: 0 | Status: SHIPPED | OUTPATIENT
Start: 2025-01-06

## 2025-01-06 RX ORDER — PROPOFOL 10 MG/ML
INJECTION, EMULSION INTRAVENOUS AS NEEDED
Status: DISCONTINUED | OUTPATIENT
Start: 2025-01-06 | End: 2025-01-06

## 2025-01-06 RX ORDER — CHLORHEXIDINE GLUCONATE 40 MG/ML
SOLUTION TOPICAL DAILY PRN
Status: DISCONTINUED | OUTPATIENT
Start: 2025-01-06 | End: 2025-01-06 | Stop reason: HOSPADM

## 2025-01-06 RX ORDER — ONDANSETRON 2 MG/ML
4 INJECTION INTRAMUSCULAR; INTRAVENOUS ONCE AS NEEDED
Status: DISCONTINUED | OUTPATIENT
Start: 2025-01-06 | End: 2025-01-06 | Stop reason: HOSPADM

## 2025-01-06 RX ORDER — ACETAMINOPHEN 325 MG/1
650 TABLET ORAL EVERY 4 HOURS PRN
Status: DISCONTINUED | OUTPATIENT
Start: 2025-01-06 | End: 2025-01-06 | Stop reason: HOSPADM

## 2025-01-06 RX ADMIN — PROPOFOL 50 MCG/KG/MIN: 10 INJECTION, EMULSION INTRAVENOUS at 13:29

## 2025-01-06 RX ADMIN — PROPOFOL 50 MG: 10 INJECTION, EMULSION INTRAVENOUS at 13:28

## 2025-01-06 RX ADMIN — CLINDAMYCIN PHOSPHATE 900 MG: 900 INJECTION, SOLUTION INTRAVENOUS at 13:25

## 2025-01-06 RX ADMIN — PHENYLEPHRINE HYDROCHLORIDE 100 MCG: 10 INJECTION INTRAVENOUS at 13:31

## 2025-01-06 RX ADMIN — FENTANYL CITRATE 50 MCG: 50 INJECTION, SOLUTION INTRAMUSCULAR; INTRAVENOUS at 13:28

## 2025-01-06 RX ADMIN — MIDAZOLAM 2 MG: 1 INJECTION INTRAMUSCULAR; INTRAVENOUS at 13:25

## 2025-01-06 RX ADMIN — SODIUM CHLORIDE, SODIUM LACTATE, POTASSIUM CHLORIDE, AND CALCIUM CHLORIDE: .6; .31; .03; .02 INJECTION, SOLUTION INTRAVENOUS at 13:18

## 2025-01-06 RX ADMIN — CHLORHEXIDINE GLUCONATE 15 ML: 1.2 SOLUTION ORAL at 11:25

## 2025-01-06 NOTE — ANESTHESIA POSTPROCEDURE EVALUATION
Post-Op Assessment Note    CV Status:  Stable  Pain Score: 0    Pain management: adequate       Mental Status:  Alert and awake   Hydration Status:  Euvolemic   PONV Controlled:  Controlled   Airway Patency:  Patent     Post Op Vitals Reviewed: Yes    No anethesia notable event occurred.    Staff: Anesthesiologist, with CRNAs           Last Filed PACU Vitals:  Vitals Value Taken Time   Temp 97.7 °F (36.5 °C) 01/06/25 1355   Pulse 73 01/06/25 1413   /67 01/06/25 1410   Resp 10 01/06/25 1413   SpO2 95 % 01/06/25 1413   Vitals shown include unfiled device data.    Modified Janine:     Vitals Value Taken Time   Activity 2 01/06/25 1410   Respiration 2 01/06/25 1410   Circulation 2 01/06/25 1410   Consciousness 2 01/06/25 1410   Oxygen Saturation 2 01/06/25 1410     Modified Janine Score: 10

## 2025-01-06 NOTE — OP NOTE
"OPERATIVE REPORT - Podiatry  PATIENT NAME: Adriana Yarbrough    :  1970  MRN: [id number]  Pt Location: [facility] OR ROOM 02    SURGERY DATE: 2025    Surgeons and Role:     * Jasbir Lentz DPM - Primary     * Claritza Hawkins DPM - Fellow    Pre-op Diagnosis:  Ganglion cyst [M67.40]    Post-Op Diagnosis Codes:     * Ganglion cyst [M67.40]    Procedure(s) (LRB):  EXCISION BIOPSY TISSUE LESION/MASS LOWER EXTREMITY (Left)    Specimen(s):  ID Type Source Tests Collected by Time Destination   1 : left foot lesion Tissue Foot, Left TISSUE EXAM Jasbir Lentz DPM 2025 1341        Estimated Blood Loss:   Minimal    Drains:  * No LDAs found *    Anesthesia Type:   Conscious Sedation  with 10 ml of 1% Lidocaine     Hemostasis:  - Pneumatic ankle tourniquet set at 250 mmHg for 10 mins  - Direct compression    Materials:  * No implants in log *  4-0 monocryl  4-0 nylon    Injectables:  None    Operative Findings:  - Consistent with diagnosis   - Removal of soft tissue mass left foot. Sent for analysis    - Primary closure achieved     Complications:   None    Procedure and Technique:     Under mild sedation, the patient was brought into the operating room and remained on the stretcher in the supine position. IV sedation was achieved by anesthesia team and a universal timeout was performed where all parties are in agreement of correct patient, correct procedure and correct site. A pneumatic tourniquet was then placed over the patient's left lower extremity with ample padding. A local block was performed consisting of 10 ml of 1% Lidocaine. The foot was then prepped and draped in the usual aseptic manner. An esmarch bandage was used to exsangunate the foot and the pneumatic tourniquet was then inflated to 250 mmHg.    Attention was then directed to the patient's left dorsolateral rearfoot were a palpable mobile mass was noted. Using a #15 blade, an \"S\" incision was made over the mass. Using both " "blunt and sharp dissection, the mass was removed in its entirety. Care was taken to retract all vital structures in the process. The soft tissue mass was then sent for analysis.     The surgical incision was irrigated with copious amounts of normal sterile saline. Subcutaneous closure was obtained utilizing 4-0 monocryl. Skin edges were reapproximated and closure was obtained utilizing 4-0 nylon. The foot was then cleansed and dried. The incision site was dressed with a band aid.     The tourniquet was deflated at approximately 10 min and normal hyperemic response was noted to all digits. The patient tolerated the procedure and anesthesia well without immediate complications and transferred to PACU with vital signs stable.     The patient is to remain Weightbearing as tolerated to the left lower extremity at this time.     Dr. Lentz was present during the entire procedure and participated in all key aspects.    SIGNATURE: Claritza Hawkins DPM  DATE: January 6, 2025  TIME: 2:02 PM      Portions of the record may have been created with voice recognition software. Occasional wrong word or \"sound a like\" substitutions may have occurred due to the inherent limitations of voice recognition software. Read the chart carefully and recognize, using context, where substitutions have occurred.    "

## 2025-01-06 NOTE — INTERVAL H&P NOTE
H&P reviewed. After examining the patient I find no changes in the patients condition since the H&P had been written.    Vitals:    01/06/25 1104   BP: 163/93   Pulse: 74   Resp: 18   Temp: 98.3 °F (36.8 °C)   SpO2: 97%

## 2025-01-06 NOTE — DISCHARGE INSTR - AVS FIRST PAGE
[facility] Podiatry   Dr. Lentz, DPM  Post-Operative Instructions    1. Take your prescribed medication as directed. You can take ibuprofen in between doses of the narcotic if breakthrough pain occurs.  2. Upon arrival at home, lie down and elevate your surgical foot on 2 pillows. Unless you're moving from the couch, bed, or bathroom, make sure to elevate the foot. Swelling is not your friend.   3. Remain quiet, off your feet as much as possible, for the first 24-48 hours. This is when your feet first swell and may become painful. After 48 hours you may begin limited walking following these restrictions:   Weightbearing as tolerated to the left lower extremity in a surgical shoe    4. Drink large quantities of water. Consume no alcohol. Continue a well-balanced diet.  5. Report any unusual discomfort or fever to this office.  6. A limited amount of discomfort and swelling is to be expected. In some cases the skin may take on a bruised appearance. The surgical solution that was applied to your foot prior to the operation is dark in color and the operation site may appear to be oozing when it actually is not.  7. A slight amount of blood is to be expected, and is no cause for alarm. Do not remove the dressings. If there is active bleeding and if the bleeding persists, add additional gauze to the bandage, apply direct pressure, elevate your feet and call this office.  8. Do not get the dressings wet. As regular bathing may be inconvenient, sponge baths are recommended. If you shower, keep the dressing dry.   9. When anesthesia wears off and if any discomfort should be present, apply an ice pack directly over the operated area for 15 minute intervals for several hours or until the pain leaves. (Use in excess in excess of 15 minutes could cause frostbite). Do not use hot water bags or electric pads. A convenient icepack can be made by placing ice cubes in a plastic bag and covering this with a towel.  10. If necessary,  take a mild laxative before retiring.  11. Wear your special boot anytime you put weight on your foot, even if it is just to walk to the bathroom and back. It will probably be a few weeks before you will be permitted to try regular shoes.  12. Having performed the operation, we are interested in a prompt recovery. Please cooperate by following the above instructions.  13. Please call to confirm your post-op appointment or call with any other questions.

## 2025-01-06 NOTE — ANESTHESIA POSTPROCEDURE EVALUATION
Post-Op Assessment Note    CV Status:  Stable  Pain Score: 0    Pain management: adequate       Mental Status:  Arousable   Hydration Status:  Stable   PONV Controlled:  None   Airway Patency:  Patent     Post Op Vitals Reviewed: Yes    No anethesia notable event occurred.    Staff: CRNA       Last Filed PACU Vitals:  Vitals Value Taken Time   Temp 97.5    Pulse 69    /61    Resp 20    SpO2 95%

## 2025-01-06 NOTE — DISCHARGE SUMMARY
Discharge Summary Outpatient Procedure Podiatry -   Lacykassy KING Talleygabbie 54 y.o. female MRN: [id number]  Unit/Bed#: OR POOL Encounter: [id number]    Admission Date: 1/6/2025     Admitting Diagnosis: Ganglion cyst [M67.40]    Discharge Diagnosis: same    Procedures Performed: EXCISION BIOPSY TISSUE LESION/MASS LOWER EXTREMITY: 17771 (CPT®)    Complications: none    Condition at Discharge: stable    Discharge instructions/Information to patient and family:   See after visit summary for information provided to patient and family.      Provisions for Follow-Up Care/Important appointments:  See after visit summary for information related to follow-up care and any pertinent home health orders.      Discharge Medications:  See after visit summary for reconciled discharge medications provided to patient and family.

## 2025-01-10 PROCEDURE — 88304 TISSUE EXAM BY PATHOLOGIST: CPT | Performed by: PATHOLOGY

## 2025-01-16 ENCOUNTER — OFFICE VISIT (OUTPATIENT)
Age: 55
End: 2025-01-16

## 2025-01-16 VITALS — BODY MASS INDEX: 25.39 KG/M2 | HEIGHT: 66 IN | WEIGHT: 158 LBS

## 2025-01-16 DIAGNOSIS — M67.471 GANGLION CYST OF BOTH FEET: Primary | ICD-10-CM

## 2025-01-16 DIAGNOSIS — M67.472 GANGLION CYST OF BOTH FEET: Primary | ICD-10-CM

## 2025-01-16 PROCEDURE — 99024 POSTOP FOLLOW-UP VISIT: CPT | Performed by: PODIATRIST

## 2025-01-16 NOTE — PROGRESS NOTES
Assessment/Plan:      Diagnoses and all orders for this visit:    Ganglion cyst of both feet        -Allergy reviewed and is consistent with ganglion cyst  - She is to return in 1 month for final sign off and consideration of intervention on the right foot considering the results of the bilateral symmetric nature of the cysts I think that we can defer MRI and surgically intervene on the right foot when she is ready to do her activities of daily living and symptomatology      Subjective:     Patient ID: Adraina Yarbrough is a 54 y.o. female.    Presents for evaluation of her left foot, 2 weeks status post surgical intervention, no issues, having some swelling pain and some bruising.  She would like to go back to work next week a possible        Review of Systems   Constitutional:  Negative for chills and fever.   HENT:  Negative for ear pain and sore throat.    Eyes:  Negative for pain and visual disturbance.   Respiratory:  Negative for cough and shortness of breath.    Cardiovascular:  Negative for chest pain and palpitations.   Gastrointestinal:  Negative for abdominal pain and vomiting.   Genitourinary:  Negative for dysuria and hematuria.   Musculoskeletal:  Negative for arthralgias and back pain.   Skin:  Negative for color change and rash.   Neurological:  Negative for seizures and syncope.   All other systems reviewed and are negative.        Objective:     Physical Exam  Skin:     Comments: No evidence of dehiscence, incision is healed, slight erythema and irritation.  Range of motion intact, no other surgical deficits, normal postoperative appearance

## 2025-02-13 ENCOUNTER — OFFICE VISIT (OUTPATIENT)
Age: 55
End: 2025-02-13
Payer: COMMERCIAL

## 2025-02-13 ENCOUNTER — PREP FOR PROCEDURE (OUTPATIENT)
Age: 55
End: 2025-02-13

## 2025-02-13 VITALS — BODY MASS INDEX: 25.39 KG/M2 | WEIGHT: 158 LBS | HEIGHT: 66 IN

## 2025-02-13 DIAGNOSIS — M67.472 GANGLION CYST OF BOTH FEET: Primary | ICD-10-CM

## 2025-02-13 DIAGNOSIS — M67.471 GANGLION CYST OF BOTH FEET: Primary | ICD-10-CM

## 2025-02-13 DIAGNOSIS — M67.40 GANGLION CYST: Primary | ICD-10-CM

## 2025-02-13 PROCEDURE — 99214 OFFICE O/P EST MOD 30 MIN: CPT | Performed by: PODIATRIST

## 2025-02-13 RX ORDER — CHLORHEXIDINE GLUCONATE 40 MG/ML
SOLUTION TOPICAL DAILY PRN
OUTPATIENT
Start: 2025-02-13

## 2025-02-13 RX ORDER — CHLORHEXIDINE GLUCONATE ORAL RINSE 1.2 MG/ML
15 SOLUTION DENTAL ONCE
OUTPATIENT
Start: 2025-02-13 | End: 2025-02-13

## 2025-02-13 NOTE — PROGRESS NOTES
Name: Adriana Yarbrough      : 1970      MRN: [id number]  Encounter Provider: Jasbir Lentz DPM  Encounter Date: 2025   Encounter department: [facility] PODIATRY [facility]  :  Assessment & Plan  Ganglion cyst of both feet         - advised scar management with massage on the left with scar away  - will plan for ganglion cyst removal on the right.   - Will need 3 wks out of work again. Discussed risks    Patient was counseled and educated on the condition and the diagnosis. The diagnosis, treatment options and prognosis were discussed with the patient.  The patient failed conservative care at this time and wished to proceed with surgical treatment.  Explained surgical details and post-op course.  Discussed all risks and complications related to the patient's condition and surgery.  The benefits of surgery were also discussed.  The patient understood that the surgery would not guarantee desirable outcome.  All questions and concerns were addressed and the consent was signed.        History of Present Illness   HPI  Adriana Yarbrough is a 54 y.o. female who presents evaluation management of her bilateral feet, she is here postsurgically for her left foot and she notes some continued mild pain along the surgical site especially lateral aspect of it.  Denies any other drainage or any other issues.  She is ready to move forward with the right foot      Review of Systems   Constitutional:  Negative for chills and fever.   HENT:  Negative for ear pain and sore throat.    Eyes:  Negative for pain and visual disturbance.   Respiratory:  Negative for cough and shortness of breath.    Cardiovascular:  Negative for chest pain and palpitations.   Gastrointestinal:  Negative for abdominal pain and vomiting.   Genitourinary:  Negative for dysuria and hematuria.   Musculoskeletal:  Negative for arthralgias and back pain.   Skin:  Negative for color change and rash.   Neurological:  Negative for  "seizures and syncope.   All other systems reviewed and are negative.         Objective   Ht 5' 6\" (1.676 m)   Wt 71.7 kg (158 lb)   BMI 25.50 kg/m²      Physical Exam  Vitals reviewed.   Constitutional:       Appearance: Normal appearance.   HENT:      Head: Normocephalic and atraumatic.      Nose: Nose normal.      Mouth/Throat:      Mouth: Mucous membranes are moist.   Eyes:      Pupils: Pupils are equal, round, and reactive to light.   Pulmonary:      Effort: Pulmonary effort is normal.   Musculoskeletal:      Comments: Well circumscribed lesion on the anterior aspect of her right foot. The left foot is healed with slight scar tissue to the periincisional area.    Neurological:      General: No focal deficit present.      Mental Status: She is alert and oriented to person, place, and time. Mental status is at baseline.           "

## 2025-03-07 ENCOUNTER — ANESTHESIA EVENT (OUTPATIENT)
Dept: PERIOP | Facility: HOSPITAL | Age: 55
End: 2025-03-07
Payer: COMMERCIAL

## 2025-04-01 RX ORDER — NICOTINE POLACRILEX 2 MG
1 GUM BUCCAL DAILY
COMMUNITY

## 2025-04-01 NOTE — PRE-PROCEDURE INSTRUCTIONS
Pre-Surgery Instructions:   Medication Instructions    atorvastatin (LIPITOR) 40 mg tablet Take night before surgery    Biotin 1 MG CAPS Stop taking 7 days prior to surgery.    cyanocobalamin (VITAMIN B-12) 500 MCG tablet Stop taking 7 days prior to surgery.    estradiol (Yuvafem) 10 MCG TABS vaginal tablet Hold day of surgery.    guaiFENesin (MUCINEX) 600 mg 12 hr tablet Take night before surgery    Levothyroxine Sodium 137 MCG CAPS Take day of surgery.    oxyCODONE-acetaminophen (Percocet) 5-325 mg per tablet Uses PRN- DO NOT take day of surgery    sertraline (ZOLOFT) 50 mg tablet Take night before surgery     Medication instructions for day of surgery reviewed. Please take all instructed medications with only a sip of water.       You will receive a call one business day prior to surgery with an arrival time and hospital directions. If your surgery is scheduled on a Monday, the hospital will be calling you on the Friday prior to your surgery. If you have not heard from anyone by 8pm, please call the hospital supervisor through the hospital  at [phone]. ([place] [phone] or [place] [phone]).    Do not eat or drink anything after midnight the night before your surgery, including candy, mints, lifesavers, or chewing gum. Do not drink alcohol 24hrs before your surgery. Try not to smoke at least 24hrs before your surgery.       Follow the pre surgery showering instructions as listed in the “My Surgical Experience Booklet” or otherwise provided by your surgeon's office. Do not use a blade to shave the surgical area 1 week before surgery. It is okay to use a clean electric clippers up to 24 hours before surgery. Do not apply any lotions, creams, including makeup, cologne, deodorant, or perfumes after showering on the day of your surgery. Do not use dry shampoo, hair spray, hair gel, or any type of hair products.     No contact lenses, eye make-up, or artificial eyelashes. Remove nail polish,  including gel polish, and any artificial, gel, or acrylic nails if possible. Remove all jewelry including rings and body piercing jewelry.     Wear causal clothing that is easy to take on and off. Consider your type of surgery.    Keep any valuables, jewelry, piercings at home. Please bring any specially ordered equipment (sling, braces) if indicated.    Arrange for a responsible person to drive you to and from the hospital on the day of your surgery. Please confirm the visitor policy for the day of your procedure when you receive your phone call with an arrival time.     Call the surgeon's office with any new illnesses, exposures, or additional questions prior to surgery.    Please reference your “My Surgical Experience Booklet” for additional information to prepare for your upcoming surgery.

## 2025-04-03 NOTE — ANESTHESIA PREPROCEDURE EVALUATION
Procedure:  EXCISION GANGLION CYST (Right: Foot)    Relevant Problems   ANESTHESIA (within normal limits)      CARDIO (within normal limits)      ENDO   (+) Postablative hypothyroidism      GI/HEPATIC   (+) GERD (gastroesophageal reflux disease)      /RENAL (within normal limits)      HEMATOLOGY (within normal limits)      NEURO/PSYCH   (+) Anxiety      PULMONARY   (+) Asthma      Endocrine   (+) Graves disease      Behavioral Health   (+) Smoking      Orthopedic/Musculoskeletal   (+) Ganglion cyst of both feet      EKG 8/2022:  Normal sinus rhythm  T wave abnormality, consider anterior ischemia  Prolonged QT  Abnormal ECG  When compared with ECG of 27-MAY-2014 14:49,  T wave inversion now evident in Anterior leads    Lab Results   Component Value Date    WBC 5.10 08/07/2022    HGB 12.0 08/07/2022    HCT 36.2 08/07/2022    MCV 94 08/07/2022     08/07/2022     Lab Results   Component Value Date    SODIUM 145 03/14/2025    K 5.1 03/14/2025     03/14/2025    CO2 29 03/14/2025    BUN 17 03/14/2025    CREATININE 0.81 03/14/2025    GLUC 121 (H) 03/14/2025    CALCIUM 10 03/14/2025     Lab Results   Component Value Date    INR 1.00 05/27/2014    PROTIME 12.7 05/27/2014     Lab Results   Component Value Date    HGBA1C 6 (H) 03/14/2025          Physical Exam    Airway    Mallampati score: I  TM Distance: >3 FB  Neck ROM: full     Dental        Cardiovascular  Cardiovascular exam normal    Pulmonary  Pulmonary exam normal     Other Findings  post-pubertal.      Anesthesia Plan  ASA Score- 2     Anesthesia Type- IV sedation with anesthesia with ASA Monitors.         Additional Monitors:     Airway Plan:            Plan Factors-Exercise tolerance (METS): >4 METS.    Chart reviewed. EKG reviewed.  Existing labs reviewed. Patient summary reviewed.                  Induction- intravenous.    Postoperative Plan-         Informed Consent- Anesthetic plan and risks discussed with patient.  I personally reviewed this  patient with the CRNA. Discussed and agreed on the Anesthesia Plan with the CRNA..      NPO Status:  No vitals data found for the desired time range.

## 2025-04-04 ENCOUNTER — ANESTHESIA (OUTPATIENT)
Dept: PERIOP | Facility: HOSPITAL | Age: 55
End: 2025-04-04
Payer: COMMERCIAL

## 2025-04-04 ENCOUNTER — HOSPITAL ENCOUNTER (OUTPATIENT)
Facility: HOSPITAL | Age: 55
Setting detail: OUTPATIENT SURGERY
Discharge: HOME | End: 2025-04-04
Attending: PODIATRIST | Admitting: PODIATRIST
Payer: COMMERCIAL

## 2025-04-04 VITALS
BODY MASS INDEX: 26.2 KG/M2 | OXYGEN SATURATION: 95 % | RESPIRATION RATE: 16 BRPM | HEIGHT: 66 IN | TEMPERATURE: 97.6 F | WEIGHT: 163 LBS | HEART RATE: 82 BPM | SYSTOLIC BLOOD PRESSURE: 112 MMHG | DIASTOLIC BLOOD PRESSURE: 59 MMHG

## 2025-04-04 DIAGNOSIS — M67.40 GANGLION CYST: ICD-10-CM

## 2025-04-04 PROBLEM — E89.0 POSTABLATIVE HYPOTHYROIDISM: Status: ACTIVE | Noted: 2025-04-04

## 2025-04-04 PROBLEM — E27.1 ADDISON'S DISEASE (HCC): Status: ACTIVE | Noted: 2025-04-04

## 2025-04-04 PROCEDURE — 28090 REMOVAL OF FOOT LESION: CPT | Performed by: PODIATRIST

## 2025-04-04 PROCEDURE — 88304 TISSUE EXAM BY PATHOLOGIST: CPT | Performed by: PATHOLOGY

## 2025-04-04 PROCEDURE — 99024 POSTOP FOLLOW-UP VISIT: CPT | Performed by: PODIATRIST

## 2025-04-04 RX ORDER — SODIUM CHLORIDE, SODIUM LACTATE, POTASSIUM CHLORIDE, CALCIUM CHLORIDE 600; 310; 30; 20 MG/100ML; MG/100ML; MG/100ML; MG/100ML
100 INJECTION, SOLUTION INTRAVENOUS CONTINUOUS
Status: DISCONTINUED | OUTPATIENT
Start: 2025-04-04 | End: 2025-04-04 | Stop reason: HOSPADM

## 2025-04-04 RX ORDER — ONDANSETRON 2 MG/ML
4 INJECTION INTRAMUSCULAR; INTRAVENOUS ONCE AS NEEDED
Status: DISCONTINUED | OUTPATIENT
Start: 2025-04-04 | End: 2025-04-04 | Stop reason: HOSPADM

## 2025-04-04 RX ORDER — PROPOFOL 10 MG/ML
INJECTION, EMULSION INTRAVENOUS AS NEEDED
Status: DISCONTINUED | OUTPATIENT
Start: 2025-04-04 | End: 2025-04-04

## 2025-04-04 RX ORDER — CLINDAMYCIN PHOSPHATE 900 MG/50ML
900 INJECTION, SOLUTION INTRAVENOUS ONCE
Status: COMPLETED | OUTPATIENT
Start: 2025-04-04 | End: 2025-04-04

## 2025-04-04 RX ORDER — CHLORHEXIDINE GLUCONATE 40 MG/ML
SOLUTION TOPICAL DAILY PRN
Status: DISCONTINUED | OUTPATIENT
Start: 2025-04-04 | End: 2025-04-04 | Stop reason: HOSPADM

## 2025-04-04 RX ORDER — MIDAZOLAM HYDROCHLORIDE 2 MG/2ML
INJECTION, SOLUTION INTRAMUSCULAR; INTRAVENOUS AS NEEDED
Status: DISCONTINUED | OUTPATIENT
Start: 2025-04-04 | End: 2025-04-04

## 2025-04-04 RX ORDER — CHLORHEXIDINE GLUCONATE ORAL RINSE 1.2 MG/ML
15 SOLUTION DENTAL ONCE
Status: COMPLETED | OUTPATIENT
Start: 2025-04-04 | End: 2025-04-04

## 2025-04-04 RX ORDER — FENTANYL CITRATE/PF 50 MCG/ML
25 SYRINGE (ML) INJECTION
Status: DISCONTINUED | OUTPATIENT
Start: 2025-04-04 | End: 2025-04-04 | Stop reason: HOSPADM

## 2025-04-04 RX ORDER — OXYCODONE AND ACETAMINOPHEN 5; 325 MG/1; MG/1
1 TABLET ORAL EVERY 6 HOURS PRN
Qty: 12 TABLET | Refills: 0 | Status: SHIPPED | OUTPATIENT
Start: 2025-04-04

## 2025-04-04 RX ORDER — GLYCOPYRROLATE 0.2 MG/ML
INJECTION INTRAMUSCULAR; INTRAVENOUS AS NEEDED
Status: DISCONTINUED | OUTPATIENT
Start: 2025-04-04 | End: 2025-04-04

## 2025-04-04 RX ORDER — ACETAMINOPHEN 325 MG/1
650 TABLET ORAL EVERY 4 HOURS PRN
Status: DISCONTINUED | OUTPATIENT
Start: 2025-04-04 | End: 2025-04-04 | Stop reason: HOSPADM

## 2025-04-04 RX ORDER — SODIUM CHLORIDE, SODIUM LACTATE, POTASSIUM CHLORIDE, CALCIUM CHLORIDE 600; 310; 30; 20 MG/100ML; MG/100ML; MG/100ML; MG/100ML
INJECTION, SOLUTION INTRAVENOUS CONTINUOUS PRN
Status: DISCONTINUED | OUTPATIENT
Start: 2025-04-04 | End: 2025-04-04

## 2025-04-04 RX ORDER — FENTANYL CITRATE 50 UG/ML
INJECTION, SOLUTION INTRAMUSCULAR; INTRAVENOUS AS NEEDED
Status: DISCONTINUED | OUTPATIENT
Start: 2025-04-04 | End: 2025-04-04

## 2025-04-04 RX ORDER — MAGNESIUM HYDROXIDE 1200 MG/15ML
LIQUID ORAL AS NEEDED
Status: DISCONTINUED | OUTPATIENT
Start: 2025-04-04 | End: 2025-04-04 | Stop reason: HOSPADM

## 2025-04-04 RX ORDER — SODIUM CHLORIDE, SODIUM LACTATE, POTASSIUM CHLORIDE, CALCIUM CHLORIDE 600; 310; 30; 20 MG/100ML; MG/100ML; MG/100ML; MG/100ML
20 INJECTION, SOLUTION INTRAVENOUS CONTINUOUS
Status: DISCONTINUED | OUTPATIENT
Start: 2025-04-04 | End: 2025-04-04 | Stop reason: HOSPADM

## 2025-04-04 RX ORDER — PROPOFOL 10 MG/ML
INJECTION, EMULSION INTRAVENOUS CONTINUOUS PRN
Status: DISCONTINUED | OUTPATIENT
Start: 2025-04-04 | End: 2025-04-04

## 2025-04-04 RX ORDER — OXYCODONE AND ACETAMINOPHEN 5; 325 MG/1; MG/1
1 TABLET ORAL EVERY 4 HOURS PRN
Status: DISCONTINUED | OUTPATIENT
Start: 2025-04-04 | End: 2025-04-04 | Stop reason: HOSPADM

## 2025-04-04 RX ADMIN — MIDAZOLAM 2 MG: 1 INJECTION INTRAMUSCULAR; INTRAVENOUS at 07:30

## 2025-04-04 RX ADMIN — SODIUM CHLORIDE, SODIUM LACTATE, POTASSIUM CHLORIDE, AND CALCIUM CHLORIDE 100 ML/HR: .6; .31; .03; .02 INJECTION, SOLUTION INTRAVENOUS at 06:54

## 2025-04-04 RX ADMIN — FENTANYL CITRATE 50 MCG: 50 INJECTION, SOLUTION INTRAMUSCULAR; INTRAVENOUS at 07:33

## 2025-04-04 RX ADMIN — PROPOFOL 100 MCG/KG/MIN: 10 INJECTION, EMULSION INTRAVENOUS at 07:33

## 2025-04-04 RX ADMIN — SODIUM CHLORIDE, SODIUM LACTATE, POTASSIUM CHLORIDE, AND CALCIUM CHLORIDE: .6; .31; .03; .02 INJECTION, SOLUTION INTRAVENOUS at 07:30

## 2025-04-04 RX ADMIN — CHLORHEXIDINE GLUCONATE 15 ML: 1.2 SOLUTION ORAL at 06:55

## 2025-04-04 RX ADMIN — PROPOFOL 100 MG: 10 INJECTION, EMULSION INTRAVENOUS at 07:33

## 2025-04-04 RX ADMIN — CLINDAMYCIN PHOSPHATE 900 MG: 900 INJECTION, SOLUTION INTRAVENOUS at 07:32

## 2025-04-04 RX ADMIN — GLYCOPYRROLATE 0.1 MG: 0.2 INJECTION INTRAMUSCULAR; INTRAVENOUS at 07:33

## 2025-04-04 NOTE — ANESTHESIA POSTPROCEDURE EVALUATION
Post-Op Assessment Note    CV Status:  Stable    Pain management: adequate       Mental Status:  Alert and awake   Hydration Status:  Euvolemic   PONV Controlled:  Controlled   Airway Patency:  Patent     Post Op Vitals Reviewed: Yes    No anethesia notable event occurred.    Staff: Anesthesiologist           Last Filed PACU Vitals:  Vitals Value Taken Time   Temp 97.6 °F (36.4 °C) 04/04/25 0800   Pulse 82 04/04/25 0818   /56 04/04/25 0815   Resp 31 04/04/25 0818   SpO2 95 % 04/04/25 0818   Vitals shown include unfiled device data.    Modified Janine:     Vitals Value Taken Time   Activity 2 04/04/25 0815   Respiration 2 04/04/25 0815   Circulation 2 04/04/25 0815   Consciousness 1 04/04/25 0815   Oxygen Saturation 2 04/04/25 0815     Modified Janine Score: 9

## 2025-04-04 NOTE — H&P
Patient seen and evaluated. Medical history reviewed. No acute changes to medical history. See anesthesia preprocedure note for full details.

## 2025-04-04 NOTE — OP NOTE
OPERATIVE REPORT - Podiatry  PATIENT NAME: Adriana Yarbrough    :  1970  MRN: [id number]  Pt Location: [facility] OR ROOM 03    SURGERY DATE: 2025    Surgeons and Role:     * Jasbir Lentz DPM - Primary     * Michell Bang DPM    Pre-op Diagnosis:  Ganglion cyst [M67.40]    Post-Op Diagnosis Codes:     * Ganglion cyst [M67.40]    Procedure(s) (LRB):  EXCISION GANGLION CYST (Right)    Specimen(s):  ID Type Source Tests Collected by Time Destination   1 : RIGHT FOOT GANGLION CYST Tissue Ganglion Cyst TISSUE EXAM Jasbir Lentz DPM 2025 0747        Estimated Blood Loss:   Minimal    Drains:  * No LDAs found *    Anesthesia Type:   Conscious Sedation  with 10 mL of 1% Lidocaine and 0.25% Bupivacaine in a 1:1 mixture    Hemostasis:  Pneumatic ankle tourniquet set at 250 mmHg for 11 mins  Direct compression    Materials:  4-0 Monocryl  4-0 Nylon    Injectables:  None    Operative Findings:  - Solitary soft issue mass from dorsal lateral foot approximately 1 cm x 1.2 cm x 1 cm resected in full, no invasion of surrounding soft tissue structures. Sent for pathology analysis    Complications:   None    Procedure and Technique:     Under mild sedation, the patient was brought into the operating room on a stretcher in the supine position. IV sedation was achieved by anesthesia team and a universal timeout was performed where all parties are in agreement of correct patient, correct procedure and correct site. A pneumatic tourniquet was then placed over the patient's right lower extremity with ample padding. A local block was performed consisting of 10 mL of 1% Lidocaine and 0.25% Bupivacaine in a 1:1 mixture. The foot was then prepped and draped in the usual aseptic manner. An esmarch bandage was used to exsangunate the foot and the pneumatic tourniquet was then inflated to 250 mmHg.    Attention was directed to right dorsal lateral proximal foot where a lazy-S skin incision was made over the palpable  "ganglion cyst. Incision was then carried through subcutaneous tissue. Any vital neurovascular structures encountered were carefully retracted from the surgical field. Using tenotomy scissors, the ganglion cyst was carefully resected free from surrounding soft tissue attachments. The solitary ganglion cyst was then removed from the dorsal foot with the stalk resected at its most distal attachment. The ganglion was sent in formalin for pathological examination.     The surgical incision was irrigated with copious amounts of normal sterile saline. Subcutaneous closure was obtained utilizing 4-0 Monocryl. Skin edges were reapproximated and closure was obtained utilizing 4-0 Nylon. The foot was then cleansed and dried. The incision site was dressed with a band-aid    The tourniquet was deflated at approximately 11 min and normal hyperemic response was noted to all digits. The patient tolerated the procedure and anesthesia well without immediate complications and transferred to PACU with vital signs stable.     Dr. Lentz was present during the entire procedure and participated in all key aspects.    The patient will be: Weightbearing as tolerated to Right lower extremity    SIGNATURE: Michell Bang DPM  DATE: April 4, 2025  TIME: 7:59 AM    Portions of the record may have been created with voice recognition software. Occasional wrong word or \"sound a like\" substitutions may have occurred due to the inherent limitations of voice recognition software. Read the chart carefully and recognize, using context, where substitutions have occurred.    "

## 2025-04-04 NOTE — DISCHARGE SUMMARY
Discharge Summary Outpatient Procedure Podiatry -   Adriana Yarbrough 54 y.o. female MRN: [id number]  Unit/Bed#: OR POOL Encounter: [id number]    Admission Date: 4/4/2025     Admitting Diagnosis: Ganglion cyst [M67.40]    Discharge Diagnosis: same    Procedures Performed: EXCISION GANGLION CYST: 40821 (CPT®), right foot    Complications: none    Condition at Discharge: stable    Discharge instructions/Information to patient and family:   See after visit summary for information provided to patient and family.      Provisions for Follow-Up Care/Important appointments:  See after visit summary for information related to follow-up care and any pertinent home health orders.      Discharge Medications:  See after visit summary for reconciled discharge medications provided to patient and family.

## 2025-04-04 NOTE — DISCHARGE INSTR - AVS FIRST PAGE
Dr. Jasbir Lentz, M  [facility] Podiatry  Post-Operative Instructions    1. Take your prescribed medication as directed. You can take ibuprofen in between doses of the narcotic if breakthrough pain occurs.  2. Upon arrival at home, lie down and elevate your surgical foot on two pillows. Unless you're moving from the couch, bed, or bathroom, make sure to elevate the foot to limit swelling.  3. Remain off your feet as much as possible for the first 24-48 hours. This is when your feet first swell and may become painful. After 48 hours you may begin limited walking following these restrictions     - Weight bear as tolerated to surgical foot (RIGHT FOOT)    4. Drink large quantities of water. Please avoid alcohol. Continue a well-balanced diet.  5. Report any unusual discomfort or fever to this office.  6. A limited amount of discomfort and swelling is to be expected. In some cases the skin may take on a bruised appearance. The surgical solution that was applied to your foot prior to the operation is dark in color and the operation site may appear to be oozing when it actually is not.  7. A slight amount of blood is to be expected, and is no cause for alarm. Do not remove the dressings. If there is active bleeding and if the bleeding persists, add additional gauze to the bandage, apply direct pressure, elevate your feet and call the office.  8. Do not get the dressings wet. As regular bathing may be inconvenient, sponge baths are recommended. If you shower, keep the dressing dry.   9. When anesthesia wears off and if any discomfort should be present, apply an ice pack directly over the operated area for 15 minute intervals for several hours or until the pain leaves. (USE IN EXCESS OF 15 MINUTES COULD CAUSE FROSTBITE). Do not use hot water bags or electric pads. A convenient icepack can be made by placing ice cubes in a plastic bag and covering this with a towel.  10. If necessary, take a mild laxative before  retiring.  11. Wear your special shoe anytime you put weight on your foot, even if it is just to walk to the bathroom and back. It will probably be a few weeks before you will be permitted to try regular shoes.  12. Having performed the operation, we are interested in a prompt recovery. Please cooperate by following the above instructions.  13. Please call to confirm your post-op appointment within one week of surgery. Please call the office with any other questions.

## 2025-04-04 NOTE — ANESTHESIA POSTPROCEDURE EVALUATION
Post-Op Assessment Note    CV Status:  Stable  Pain Score: 0    Pain management: adequate       Mental Status:  Alert and awake   Hydration Status:  Euvolemic   PONV Controlled:  Controlled   Airway Patency:  Patent     Post Op Vitals Reviewed: Yes    No anethesia notable event occurred.    Staff: CRNA           Last Filed PACU Vitals:  Vitals Value Taken Time   Temp 97    Pulse 70    BP 97/54    Resp 12    SpO2 96

## 2025-04-09 PROCEDURE — 88304 TISSUE EXAM BY PATHOLOGIST: CPT | Performed by: PATHOLOGY

## 2025-04-17 ENCOUNTER — OFFICE VISIT (OUTPATIENT)
Age: 55
End: 2025-04-17

## 2025-04-17 VITALS — HEIGHT: 66 IN | BODY MASS INDEX: 26.2 KG/M2 | WEIGHT: 163 LBS

## 2025-04-17 DIAGNOSIS — M67.40 GANGLION CYST: Primary | ICD-10-CM

## 2025-04-17 PROCEDURE — 99024 POSTOP FOLLOW-UP VISIT: CPT | Performed by: PODIATRIST

## 2025-04-17 NOTE — PROGRESS NOTES
PATIENT:  Adriana Yarbrough      1970    ASSESSMENT     1. Ganglion cyst               PLAN  Patient is doing well post-operatively.  Sutures removed todasy  - return to shoes  - doing well  - RTC for final sign off.     HISTORY OF PRESENT ILLNESS  Patient presents for post-op appointment.  Post-op pain is under control and resolving well.  The patient is feeling well and in good spirits.  Patient reported no post-op concern.  Patient relates compliance with surgical shoe, elevation, and keeping dressing clean, dry and intact.    REVIEW OF SYSTEMS  GENERAL: No fever or chills.    HEART: No chest pain, or palpitation  RESPIRATORY:  No SOB or cough  GI: No Nausea, vomit or diarrhea  NEUROLOGIC: No syncope or acute weakness  MUSCULOSKELETAL: No calf pain or edema.      PHYSICAL EXAMINATION  GENERAL  The patient appears in NAD / non-toxic. Afebrile. VSS    VASCULAR EXAM  Pedal pulses and vascular status are intact.  No calf pain or edema bilaterally.  No cyanosis.    DERMATOLOGIC EXAM  Incision is coapted and healing well.  No signs of infection. No active drainage. Normal post-op edema and ecchymosis. No necrosis or dehiscence.    NEUROLOGIC EXAM  AAO X 3.  No focal neurologic deficit.  Neurologic status is intact BLE.    MUSCULOSKELETAL EXAM  Good surgical correction.  Normal post-op findings. ROM intact.  No fluctuation or crepitus.

## 2025-05-20 ENCOUNTER — OFFICE VISIT (OUTPATIENT)
Age: 55
End: 2025-05-20

## 2025-05-20 VITALS — BODY MASS INDEX: 26.2 KG/M2 | WEIGHT: 163 LBS | HEIGHT: 66 IN

## 2025-05-20 DIAGNOSIS — M67.472 GANGLION CYST OF BOTH FEET: ICD-10-CM

## 2025-05-20 DIAGNOSIS — M67.40 GANGLION CYST: Primary | ICD-10-CM

## 2025-05-20 DIAGNOSIS — M67.471 GANGLION CYST OF BOTH FEET: ICD-10-CM

## 2025-05-20 PROCEDURE — 99024 POSTOP FOLLOW-UP VISIT: CPT | Performed by: PODIATRIST

## 2025-05-20 NOTE — PROGRESS NOTES
"Name: Adriana Yarbrough      : 1970      MRN: [id number]  Encounter Provider: Jasbir Lentz DPM  Encounter Date: 2025   Encounter department: [facility] PODIATRY [facility] [address]  :  Assessment & Plan  Ganglion cyst         Ganglion cyst of both feet         - She is able to be discharged, follow-up urine, no further care necessary, incisions are healed, normal postoperative.    History of Present Illness   HPI  Adriana Yarbrough is a 55 y.o. female who presents for evaluation management of her right foot.       Review of Systems   Constitutional:  Negative for chills and fever.   HENT:  Negative for ear pain and sore throat.    Eyes:  Negative for pain and visual disturbance.   Respiratory:  Negative for cough and shortness of breath.    Cardiovascular:  Negative for chest pain and palpitations.   Gastrointestinal:  Negative for abdominal pain and vomiting.   Genitourinary:  Negative for dysuria and hematuria.   Musculoskeletal:  Negative for arthralgias and back pain.   Skin:  Negative for color change and rash.   Neurological:  Negative for seizures and syncope.   All other systems reviewed and are negative.         Objective   Ht 5' 6\" (1.676 m)   Wt 73.9 kg (163 lb)   BMI 26.31 kg/m²      Physical Exam  Constitutional:       Appearance: Normal appearance.   HENT:      Head: Normocephalic and atraumatic.      Nose: Nose normal.      Mouth/Throat:      Mouth: Mucous membranes are moist.     Eyes:      Pupils: Pupils are equal, round, and reactive to light.     Pulmonary:      Effort: Pulmonary effort is normal.   Abdominal:      General: Abdomen is flat.     Musculoskeletal:      Comments: Normal postop appearance, masses resolved, no pain, good postoperative result     Skin:     Capillary Refill: Capillary refill takes 2 to 3 seconds.     Neurological:      General: No focal deficit present.      Mental Status: She is alert and oriented to person, place, and time. Mental status is at " baseline.

## (undated) DEVICE — ENDOPOUCH RETRIEVER SPECIMEN RETRIEVAL BAGS: Brand: ENDOPOUCH RETRIEVER

## (undated) DEVICE — PACK PBDS LAP CHOLE RF

## (undated) DEVICE — BETHLEHEM UNIVERSAL  MIONR EXT: Brand: CARDINAL HEALTH

## (undated) DEVICE — SYRINGE 10ML LL

## (undated) DEVICE — CURITY STRETCH BANDAGE: Brand: CURITY

## (undated) DEVICE — LAPAROSCOPIC SCISSORS: Brand: EPIX LAPAROSCOPIC SCISSORS

## (undated) DEVICE — IRRIGATOR DISPOSABLE SUCTION

## (undated) DEVICE — GLOVE INDICATOR PI UNDERGLOVE SZ 8 BLUE

## (undated) DEVICE — 4-PORT MANIFOLD: Brand: NEPTUNE 2

## (undated) DEVICE — ZIMMER® STERILE DISPOSABLE TOURNIQUET CUFF, DUAL PORT, SINGLE BLADDER, 18 IN. (46 CM)

## (undated) DEVICE — SUT VICRYL 0 UR-6 27 IN J603H

## (undated) DEVICE — GLOVE INDICATOR PI UNDERGLOVE SZ 6.5 BLUE

## (undated) DEVICE — GLOVE SRG BIOGEL 8

## (undated) DEVICE — CYSTO TUBING SINGLE IRRIGATION

## (undated) DEVICE — 5 MM CURVED DISSECTORS WITH MONOPOLAR CAUTERY: Brand: ENDOPATH

## (undated) DEVICE — NEEDLE 25G X 1 1/2

## (undated) DEVICE — 10FR FRAZIER SUCTION HANDLE: Brand: CARDINAL HEALTH

## (undated) DEVICE — GLOVE SRG BIOGEL 6.5

## (undated) DEVICE — INTENDED FOR TISSUE SEPARATION, AND OTHER PROCEDURES THAT REQUIRE A SHARP SURGICAL BLADE TO PUNCTURE OR CUT.: Brand: BARD-PARKER ® CARBON RIB-BACK BLADES

## (undated) DEVICE — ASTOUND STANDARD SURGICAL GOWN, XL: Brand: CONVERTORS

## (undated) DEVICE — 3M™ TEGADERM™ TRANSPARENT FILM DRESSING FRAME STYLE, 1624W, 2-3/8 IN X 2-3/4 IN (6 CM X 7 CM), 100/CT 4CT/CASE: Brand: 3M™ TEGADERM™

## (undated) DEVICE — GAUZE SPONGES,8 PLY: Brand: CURITY

## (undated) DEVICE — GLOVE INDICATOR PI UNDERGLOVE SZ 7 BLUE

## (undated) DEVICE — GLOVE SRG BIOGEL 7

## (undated) DEVICE — CLOSURE DEVICE ENDO CLOSE

## (undated) DEVICE — ENDOPATH XCEL BLADELESS TROCARS WITH STABILITY SLEEVES: Brand: ENDOPATH XCEL

## (undated) DEVICE — DRAPE C-ARM X-RAY

## (undated) DEVICE — ENDOPATH 5 MM GRASPERS WITH RATCHET HANDLES: Brand: ENDOPATH

## (undated) DEVICE — FLEXIBLE ADHESIVE BANDAGE,X-LARGE: Brand: CURITY

## (undated) DEVICE — CHLORAPREP HI-LITE 26ML ORANGE

## (undated) DEVICE — TAUT CATH INTRODUCER 4.5 FR

## (undated) DEVICE — GAUZE SPONGES,16 PLY: Brand: CURITY

## (undated) DEVICE — SYRINGE 20ML LL

## (undated) DEVICE — NEPTUNE E-SEP SMOKE EVACUATION PENCIL, COATED, 70MM BLADE, PUSH BUTTON SWITCH: Brand: NEPTUNE E-SEP

## (undated) DEVICE — SUT MONOCRYL 4-0 PS-2 27 IN Y426H

## (undated) DEVICE — TUBING SMOKE EVAC W/FILTRATION DEVICE PLUMEPORT ACTIV

## (undated) DEVICE — POV-IOD SOLUTION 4OZ BT

## (undated) DEVICE — ASTOUND IMPERVIOUS SURGICAL GOWN: Brand: CONVERTORS

## (undated) DEVICE — CLIP ENDO 5MM M/L

## (undated) DEVICE — TROCAR: Brand: KII FIOS FIRST ENTRY

## (undated) DEVICE — HARMONIC 1100 SHEARS, 36CM SHAFT LENGTH: Brand: HARMONIC

## (undated) DEVICE — ADHESIVE SKIN HIGH VISCOSITY EXOFIN 1ML

## (undated) DEVICE — CURITY NON-ADHERENT STRIPS: Brand: CURITY

## (undated) DEVICE — SINGLE PORT MANIFOLD: Brand: NEPTUNE 2

## (undated) DEVICE — PREMIUM DRY TRAY LF: Brand: MEDLINE INDUSTRIES, INC.

## (undated) DEVICE — GLOVE INDICATOR PI UNDERGLOVE SZ 7.5 BLUE

## (undated) DEVICE — SCD SEQUENTIAL COMPRESSION COMFORT SLEEVE MEDIUM KNEE LENGTH: Brand: KENDALL SCD

## (undated) DEVICE — GARMENT,MEDLINE,DVT,INT,CALF,FOAM,MED: Brand: MEDLINE

## (undated) DEVICE — DISPOSABLE OR TOWEL: Brand: CARDINAL HEALTH